# Patient Record
Sex: FEMALE | Race: WHITE | Employment: OTHER | URBAN - METROPOLITAN AREA
[De-identification: names, ages, dates, MRNs, and addresses within clinical notes are randomized per-mention and may not be internally consistent; named-entity substitution may affect disease eponyms.]

---

## 2018-02-28 PROBLEM — R09.89 BRUIT OF LEFT CAROTID ARTERY: Status: ACTIVE | Noted: 2018-02-28

## 2018-02-28 PROBLEM — I65.23 STENOSIS OF BOTH EXTERNAL CAROTID ARTERIES: Status: ACTIVE | Noted: 2018-02-28

## 2019-07-17 ENCOUNTER — HOSPITAL ENCOUNTER (OUTPATIENT)
Age: 84
Discharge: HOME | End: 2019-07-19
Payer: MEDICARE

## 2019-07-17 ENCOUNTER — HOSPITAL ENCOUNTER (OUTPATIENT)
Dept: GENERAL RADIOLOGY | Age: 84
Discharge: HOME | End: 2019-07-19
Payer: MEDICARE

## 2019-07-17 ENCOUNTER — HOSPITAL ENCOUNTER (EMERGENCY)
Age: 84
Discharge: HOME | End: 2019-07-17
Payer: MEDICARE

## 2019-07-17 VITALS
HEART RATE: 96 BPM | RESPIRATION RATE: 16 BRPM | SYSTOLIC BLOOD PRESSURE: 110 MMHG | TEMPERATURE: 97.8 F | OXYGEN SATURATION: 98 % | BODY MASS INDEX: 20.99 KG/M2 | HEIGHT: 65 IN | DIASTOLIC BLOOD PRESSURE: 72 MMHG | WEIGHT: 126 LBS

## 2019-07-17 DIAGNOSIS — S99.922A: ICD-10-CM

## 2019-07-17 DIAGNOSIS — S92.502A CLOSED FRACTURE OF PHALANX OF LEFT FOURTH TOE, INITIAL ENCOUNTER: ICD-10-CM

## 2019-07-17 DIAGNOSIS — S92.352A CLOSED DISPLACED FRACTURE OF FIFTH METATARSAL BONE OF LEFT FOOT, INITIAL ENCOUNTER: Primary | ICD-10-CM

## 2019-07-17 PROCEDURE — 73630 X-RAY EXAM OF FOOT: CPT

## 2019-07-17 PROCEDURE — 99283 EMERGENCY DEPT VISIT LOW MDM: CPT

## 2019-07-17 ASSESSMENT — PAIN DESCRIPTION - PROGRESSION: CLINICAL_PROGRESSION: NOT CHANGED

## 2019-07-17 ASSESSMENT — PAIN DESCRIPTION - DESCRIPTORS
DESCRIPTORS: SORE
DESCRIPTORS: ACHING;CONSTANT;DISCOMFORT

## 2019-07-17 ASSESSMENT — PAIN DESCRIPTION - ORIENTATION
ORIENTATION: LEFT
ORIENTATION: LEFT

## 2019-07-17 ASSESSMENT — PAIN DESCRIPTION - PAIN TYPE
TYPE: ACUTE PAIN
TYPE: ACUTE PAIN

## 2019-07-17 ASSESSMENT — PAIN DESCRIPTION - LOCATION
LOCATION: FOOT
LOCATION: FOOT

## 2019-07-17 ASSESSMENT — PAIN DESCRIPTION - ONSET: ONSET: SUDDEN

## 2019-07-17 ASSESSMENT — PAIN SCALES - GENERAL
PAINLEVEL_OUTOF10: 5
PAINLEVEL_OUTOF10: 5

## 2019-07-17 ASSESSMENT — PAIN DESCRIPTION - FREQUENCY: FREQUENCY: CONTINUOUS

## 2019-07-17 NOTE — ED PROVIDER NOTES
She has never used smokeless tobacco. She reports that she does not drink alcohol or use drugs. Family History: family history is not on file. The patients home medications have been reviewed. Allergies: Pcn [penicillins]    --------------------------------- RESULTS ------------------------------------------  All laboratory and radiology results have been personally reviewed by myself   LABS:  No results found for this visit on 07/17/19. RADIOLOGY:  Interpreted by Radiologist.  No orders to display       ----------------- NURSING NOTES AND VITALS REVIEWED ---------------   The nursing notes within the ED encounter and vital signs as below have been reviewed. /72   Pulse 96   Temp 97.8 °F (36.6 °C) (Oral)   Resp 16   Ht 5' 5\" (1.651 m)   Wt 126 lb (57.2 kg)   SpO2 98%   BMI 20.97 kg/m²   Oxygen Saturation Interpretation: Normal      --------------------------------PHYSICAL EXAM------------------------------------      Constitutional/General: Alert and oriented x3, well appearing, non toxic in NAD  Head: NC/AT  Eyes: PERRL, EOMI  Mouth: Oropharynx clear, handling secretions, no trismus  Neck: Supple, full ROM, no meningeal signs  Pulmonary: Lungs clear to auscultation bilaterally, no wheezes, rales, or rhonchi. Not in respiratory distress  Cardiovascular:  Regular rate and rhythm, no murmurs, gallops, or rubs. 2+ distal pulses  Extremities: Moves all extremities x 4. Exam the left foot is remarkable for market swelling bruising and tenderness over the dorsum of the foot and fifth metatarsal.  She has bruising as well noted over the plantar surface and in the third fourth and fifth toes. Diffusely tender at the sites as well. Pulses and sensation intact. No pain over the ankle joint. Warm and well perfused  Skin: warm and dry without rash  Neurologic: GCS 15,  Intact.   No focal deficits  Psych: Normal Affect      ------------------------ ED COURSE/MEDICAL DECISION MAKING----------------------  Medications - No data to display      Medical Decision Making:    The patient has a fracture of the left fifth metatarsal and left fourth toe. She was placed in a postop shoe. Patient can use Tylenol for pain. Advised to follow-up with the podiatrist as planned tomorrow. She should probably use a walker for now. Discussed plan for follow-up       Counseling: The emergency provider has spoken with the patient and discussed todays results, in addition to providing specific details for the plan of care and counseling regarding the diagnosis and prognosis. Questions are answered at this time and they are agreeable with the plan.      ------------------------ IMPRESSION AND DISPOSITION -------------------------------    IMPRESSION  1. Closed displaced fracture of fifth metatarsal bone of left foot, initial encounter    2.  Closed fracture of phalanx of left fourth toe, initial encounter        DISPOSITION  Disposition: Discharge to home  Patient condition is stable                   Rj Mas PA-C  07/17/19 1828

## 2019-08-12 ENCOUNTER — HOSPITAL ENCOUNTER (OUTPATIENT)
Dept: WOUND CARE | Age: 84
Discharge: HOME | End: 2019-08-12
Payer: MEDICARE

## 2019-08-12 VITALS
RESPIRATION RATE: 16 BRPM | DIASTOLIC BLOOD PRESSURE: 70 MMHG | SYSTOLIC BLOOD PRESSURE: 106 MMHG | BODY MASS INDEX: 20.99 KG/M2 | HEIGHT: 65 IN | HEART RATE: 84 BPM | TEMPERATURE: 98.2 F | WEIGHT: 126 LBS

## 2019-08-12 DIAGNOSIS — I65.23 STENOSIS OF BOTH EXTERNAL CAROTID ARTERIES: ICD-10-CM

## 2019-08-12 DIAGNOSIS — L97.522 ULCERATED, FOOT, LEFT, WITH FAT LAYER EXPOSED (HCC): Primary | ICD-10-CM

## 2019-08-12 DIAGNOSIS — I70.244 ATHEROSCLEROSIS OF NATIVE ARTERY OF LEFT LOWER EXTREMITY WITH ULCERATION OF MIDFOOT (HCC): ICD-10-CM

## 2019-08-12 DIAGNOSIS — L97.521 ULCERATED, FOOT, LEFT, LIMITED TO BREAKDOWN OF SKIN (HCC): ICD-10-CM

## 2019-08-12 DIAGNOSIS — R09.89 BRUIT OF LEFT CAROTID ARTERY: ICD-10-CM

## 2019-08-12 PROCEDURE — 11042 DBRDMT SUBQ TIS 1ST 20SQCM/<: CPT

## 2019-08-12 PROCEDURE — 99214 OFFICE O/P EST MOD 30 MIN: CPT

## 2019-08-12 PROCEDURE — 99214 OFFICE O/P EST MOD 30 MIN: CPT | Performed by: SURGERY

## 2019-08-12 PROCEDURE — 97597 DBRDMT OPN WND 1ST 20 CM/<: CPT | Performed by: SURGERY

## 2019-08-12 PROCEDURE — 87075 CULTR BACTERIA EXCEPT BLOOD: CPT

## 2019-08-12 PROCEDURE — 11042 DBRDMT SUBQ TIS 1ST 20SQCM/<: CPT | Performed by: SURGERY

## 2019-08-12 PROCEDURE — 87070 CULTURE OTHR SPECIMN AEROBIC: CPT

## 2019-08-12 RX ORDER — GABAPENTIN 100 MG/1
100 CAPSULE ORAL 2 TIMES DAILY
COMMUNITY
End: 2019-10-28 | Stop reason: ALTCHOICE

## 2019-08-12 RX ORDER — LIDOCAINE HYDROCHLORIDE 20 MG/ML
JELLY TOPICAL ONCE
Status: DISCONTINUED | OUTPATIENT
Start: 2019-08-12 | End: 2019-08-13 | Stop reason: HOSPADM

## 2019-08-12 RX ORDER — CILOSTAZOL 100 MG/1
100 TABLET ORAL 2 TIMES DAILY
Qty: 60 TABLET | Refills: 11 | Status: SHIPPED | OUTPATIENT
Start: 2019-08-12 | End: 2019-10-07

## 2019-08-12 RX ORDER — DOXYCYCLINE HYCLATE 100 MG/1
100 CAPSULE ORAL DAILY
COMMUNITY
End: 2019-08-19 | Stop reason: ALTCHOICE

## 2019-08-12 NOTE — PROGRESS NOTES
Pressure Ulcers only:  Ulcer: Diabetic ulcer, fat layer exposed    If patient has diabetic lower extremity wounds  Lanier Classification of diabetic lower extremity wounds:    Grade Description   []  0 No open wound   []  1 Superficial ulcer involving the full skin thickness   []  2 Deep ulcer involves ligament, tendon, joint capsule, or fascia  No bone involvement or abscess presence   []  3 Deep Ulcer with abcess formation and/or osteomyelitis   []  4 Localized gangrene   []  5 Extensive gangrene of the foot     Wound:     Patient does have a superficial ulcer, with skin breakdown, clean tissue underneath it healthy, over the medial aspect of the right foot, and over the lateral aspect, full-thickness, with fat layer exposed, with some exudate and some nonviable tissue noted with slight erythema      PAST MEDICAL HISTORY      Diagnosis Date    Carotid stenosis     Diabetes ([address]     Hyperlipidemia     Hypertension     Stenosis of both external carotid arteries 2/28/2018    Ulcerated, foot, left, limited to breakdown of skin ([address] 8/12/2019    Ulcerated, foot, left, with fat layer exposed [address] 8/12/2019    Vitamin D deficiency      Past Surgical History:   Procedure Laterality Date    APPENDECTOMY      EYE SURGERY Bilateral     cataract    TONSILLECTOMY      WISDOM TOOTH EXTRACTION       History reviewed. No pertinent family history. Social History     Tobacco Use    Smoking status: Never Smoker    Smokeless tobacco: Never Used   Substance Use Topics    Alcohol use: No    Drug use: Never     Allergies   Allergen Reactions    Pcn [Penicillins] Hives and Itching     Current Outpatient Medications on File Prior to Encounter   Medication Sig Dispense Refill    gabapentin (NEURONTIN) 100 MG capsule Take 100 mg by mouth 3 times daily.       doxycycline hyclate (VIBRAMYCIN) 100 MG capsule Take 100 mg by mouth daily      BYSTOLIC 10 MG tablet Take 10 mg by mouth daily  3    glipiZIDE (GLUCOTROL) 10 MG Hernias is not noted   Aorta is not palpable   Lymphatics : Cervical lymphadenopathy is not noted     Femoral lymphadenopathy is not noted  SKIN:   Skin color is normal   Texture and turgor is  normal   Induration is not noted  EXTREMITIES:   R UE Edema is not noted  L UE Edema is not noted  R LE Edema is not noted  L LE Edema is not noted    Patient does have a ulcer over the medial aspect of the left foot full-thickness involved  , Also has a blister with exposure of fat layer, over the lateral aspect of the left foot, with slight erythema without any purulent drainage      R femoral 2  left femoral 2   R dorsalis pedis 1 L dorsalis pedis 0   R posterior tibial 0 L posterior tibial 0     Assessment:       1. The lower extremity arterial Doppler study that was done was personally reviewed by me, bilateral femoral-popliteal arterial occlusive disease, with an ankle-brachial index of 0.83 on the right and 0.78 on the left side, with biphasic ankle Doppler tracings, potentially adequate collateral flow to both feet based upon the pulse volume recordings of the metatarsals and toes for tissue healing  Problem List Items Addressed This Visit     Ulcerated, foot, left, limited to breakdown of skin ([address]          2. Ulcer left foot with the fat layer exposed    3. Diabetes mellitus with diabetic neuropathy      Procedure Note  Indications:  Based on my examination of this patient's wound(s)/ulcer(s) today, debridement is required to promote healing and evaluate the wound base. Performed by: Kvng Quintero MD    Consent obtained:  Yes    Time out taken:  Yes    Pain Control: Anesthetic  Anesthetic: 2% Lidocaine Gel Topical     Debridement:Excisional Debridement    Using curette, #15 blade scalpel, scissors and forceps the wound(s)/ulcer(s) was/were sharply debrided down through and including the removal of epidermis, dermis and subcutaneous tissue.         Devitalized Tissue Debrided:  fibrin    Pre Debridement Measurements:  Are located in the [facility]  Documentation Flow Sheet    Wound/Ulcer #: 1 and 2    Post Debridement Measurements:  Wound/Ulcer Descriptions are Pre Debridement except measurements:    Wound 08/12/19 Foot Anterior; Left wound #1 left anterior foot acquired 7/22/19 (Active)   Wound Image   8/12/2019  9:04 AM   Wound Length (cm) 5.3 cm 8/12/2019  9:04 AM   Wound Width (cm) 5.2 cm 8/12/2019  9:04 AM   Wound Depth (cm) 0.1 cm 8/12/2019  9:04 AM   Wound Surface Area (cm^2) 27.56 cm^2 8/12/2019  9:04 AM   Wound Volume (cm^3) 2.76 cm^3 8/12/2019  9:04 AM   Post-Procedure Length (cm) 5.5 cm 8/12/2019  9:33 AM   Post-Procedure Width (cm) 5.5 cm 8/12/2019  9:33 AM   Post-Procedure Depth (cm) 0.1 cm 8/12/2019  9:33 AM   Post-Procedure Surface Area (cm^2) 30.25 cm^2 8/12/2019  9:33 AM   Post-Procedure Volume (cm^3) 3.02 cm^3 8/12/2019  9:33 AM   Wound Assessment Pink;Purple 8/12/2019  9:04 AM   Drainage Amount None 8/12/2019  9:04 AM   Odor None 8/12/2019  9:04 AM   Radha-wound Assessment Red 8/12/2019  9:04 AM   Non-staged Wound Description Full thickness 8/12/2019  9:04 AM   Number of days: 0       Wound 08/12/19 Foot Left;Lateral wound #2 left lateral foot acquired 7/22/19 (Active)   Wound Image   8/12/2019  9:04 AM   Wound Length (cm) 7.1 cm 8/12/2019  9:04 AM   Wound Width (cm) 2 cm 8/12/2019  9:04 AM   Wound Depth (cm) 0 cm 8/12/2019  9:04 AM   Wound Surface Area (cm^2) 14.2 cm^2 8/12/2019  9:04 AM   Wound Volume (cm^3) 0 cm^3 8/12/2019  9:04 AM   Post-Procedure Length (cm) 7.3 cm 8/12/2019  9:33 AM   Post-Procedure Width (cm) 2.1 cm 8/12/2019  9:33 AM   Post-Procedure Depth (cm) 0.1 cm 8/12/2019  9:33 AM   Post-Procedure Surface Area (cm^2) 15.33 cm^2 8/12/2019  9:33 AM   Post-Procedure Volume (cm^3) 1.53 cm^3 8/12/2019  9:33 AM   Wound Assessment Yellow 8/12/2019  9:04 AM   Drainage Amount None 8/12/2019  9:04 AM   Odor None 8/12/2019  9:04 AM   Radha-wound Assessment Dark edges 8/12/2019  9:04 GIVEN.    KEEP ALL PRESSURE OFF WOUNDS        [facility] followup visit __1 WEEK___________________________  (Please note your next appointment above and if you are unable to keep, kindly give a 24 hour notice. Thank you.)    Physician signature:__________________________      If you experience any of the following, please call the [facility] during business hours:    * Increase in Pain  * Temperature over 101  * Increase in drainage from your wound  * Drainage with a foul odor  * Bleeding  * Increase in swelling  * Need for compression bandage changes due to slippage, breakthrough drainage. If you need medical attention outside of the business hours of the [facility] please contact your PCP or go to the nearest emergency room.

## 2019-08-14 LAB — WOUND/ABSCESS: NORMAL

## 2019-08-17 LAB — ANAEROBIC CULTURE: NORMAL

## 2019-08-19 ENCOUNTER — HOSPITAL ENCOUNTER (OUTPATIENT)
Dept: WOUND CARE | Age: 84
Discharge: HOME | End: 2019-08-19
Payer: MEDICARE

## 2019-08-19 VITALS
WEIGHT: 126 LBS | SYSTOLIC BLOOD PRESSURE: 138 MMHG | BODY MASS INDEX: 20.99 KG/M2 | DIASTOLIC BLOOD PRESSURE: 58 MMHG | HEART RATE: 88 BPM | HEIGHT: 65 IN | RESPIRATION RATE: 18 BRPM | TEMPERATURE: 97.7 F

## 2019-08-19 DIAGNOSIS — L97.522 ULCERATED, FOOT, LEFT, WITH FAT LAYER EXPOSED (HCC): ICD-10-CM

## 2019-08-19 DIAGNOSIS — I70.244 ATHEROSCLEROSIS OF NATIVE ARTERY OF LEFT LOWER EXTREMITY WITH ULCERATION OF MIDFOOT (HCC): ICD-10-CM

## 2019-08-19 DIAGNOSIS — L97.521 ULCERATED, FOOT, LEFT, LIMITED TO BREAKDOWN OF SKIN (HCC): ICD-10-CM

## 2019-08-19 PROCEDURE — 11042 DBRDMT SUBQ TIS 1ST 20SQCM/<: CPT

## 2019-08-19 PROCEDURE — 11042 DBRDMT SUBQ TIS 1ST 20SQCM/<: CPT | Performed by: SURGERY

## 2019-08-19 PROCEDURE — 97597 DBRDMT OPN WND 1ST 20 CM/<: CPT | Performed by: SURGERY

## 2019-08-19 PROCEDURE — 97597 DBRDMT OPN WND 1ST 20 CM/<: CPT

## 2019-08-19 RX ORDER — LIDOCAINE HYDROCHLORIDE 20 MG/ML
JELLY TOPICAL ONCE
Status: DISCONTINUED | OUTPATIENT
Start: 2019-08-19 | End: 2019-08-20 | Stop reason: HOSPADM

## 2019-08-19 NOTE — PROGRESS NOTES
PRESSURE OFF WOUNDS           [facility] followup visit __1 WEEK___________________________  (Please note your next appointment above and if you are unable to keep, kindly give a 24 hour notice.  Thank you.)     Physician signature:__________________________        If you experience any of the following, please call the [facility] during business hours:     * Increase in Pain  * Temperature over 101  * Increase in drainage from your wound  * Drainage with a foul odor  * Bleeding  * Increase in swelling  * Need for compression bandage changes due to slippage, breakthrough drainage.     If you need medical attention outside of the business hours of the [facility] please contact your PCP or go to the nearest emergency room.                 Electronically signed by Olivia Bai MD on 8/19/2019 at 9:23 AM

## 2019-08-26 ENCOUNTER — HOSPITAL ENCOUNTER (OUTPATIENT)
Dept: WOUND CARE | Age: 84
Discharge: HOME | End: 2019-08-26
Payer: MEDICARE

## 2019-08-26 VITALS
DIASTOLIC BLOOD PRESSURE: 50 MMHG | HEIGHT: 65 IN | TEMPERATURE: 98 F | SYSTOLIC BLOOD PRESSURE: 120 MMHG | WEIGHT: 126 LBS | BODY MASS INDEX: 20.99 KG/M2 | HEART RATE: 68 BPM | RESPIRATION RATE: 18 BRPM

## 2019-08-26 DIAGNOSIS — I70.244 ATHEROSCLEROSIS OF NATIVE ARTERY OF LEFT LOWER EXTREMITY WITH ULCERATION OF MIDFOOT (HCC): ICD-10-CM

## 2019-08-26 DIAGNOSIS — L97.521 ULCERATED, FOOT, LEFT, LIMITED TO BREAKDOWN OF SKIN (HCC): ICD-10-CM

## 2019-08-26 DIAGNOSIS — L97.522 ULCERATED, FOOT, LEFT, WITH FAT LAYER EXPOSED (HCC): ICD-10-CM

## 2019-08-26 PROCEDURE — 11042 DBRDMT SUBQ TIS 1ST 20SQCM/<: CPT | Performed by: SURGERY

## 2019-08-26 PROCEDURE — 11042 DBRDMT SUBQ TIS 1ST 20SQCM/<: CPT

## 2019-08-26 RX ORDER — LIDOCAINE HYDROCHLORIDE 20 MG/ML
JELLY TOPICAL ONCE
Status: DISCONTINUED | OUTPATIENT
Start: 2019-08-26 | End: 2019-08-27 | Stop reason: HOSPADM

## 2019-08-26 NOTE — PROGRESS NOTES
Wound Healing Center Followup Visit Note    Referring Physician : Tyree Murphy MD  Dahiana Hahn  MEDICAL RECORD NUMBER:  [id number]  AGE: 80 y.o. GENDER: female  : 1927  EPISODE DATE:  2019    Subjective:           Chief Complaint   Patient presents with    Wound Check       left foot         HISTORY of PRESENT ILLNESS HPI      Lina Palomares is a 80 y. o. female who presents today for wound/ulcer evaluation.    History of Wound Context:  The patient has had a wound of left foot which was first noted approximately in July of this year, patient fell down, sustained a fracture of the left fifth toe, according the patient and her son Cyndi Brewer, this was immobilized with a Ace bandage wrap, a few days later patient developed pain, the Ace bandage was removed, patient developed pressure ulcers over the medial aspect of the left foot as well as the lateral aspect of left foot with skin breakdown, underwent evaluation by her podiatrist Dr. Skylar Garner, did undergo a lower [employer] arterial Doppler study revealed evidence of femoral-popliteal arterial occlusive disease, and patient was referred to the wound care center for further evaluation and management.  This has been treated . Guerita Cardenas their initial visit to the wound healing center, 19, the patient has noted that the wound has not been improving.  The patient has not had similar previous wounds in the past.       Patient does give history of diabetes mellitus and neuropathy     Patient can walk a few blocks slowly     Pt is currently on abx, doxycycline.     Wound/Ulcer Pain Timing/Severity: constant  Quality of pain: aching, burning, tingling  Severity:  3 / 10   Modifying Factors: Pain worsens with walking  Associated Signs/Symptoms: pain and tingling     Ulcer Identification:  Ulcer Type: arterial, diabetic, pressure and non-healing/non-surgical  Contributing Factors: diabetes and arterial insufficiency     Diabetic/Pressure/Non Pressure Ulcers Changed/New 8/19/2019  9:27 AM   Dressing/Treatment Xeroform;Dry Dressing 8/19/2019  9:27 AM   Wound Cleansed Rinsed/Irrigated with saline 8/19/2019  9:27 AM   Wound Length (cm) 7 cm 8/26/2019  9:19 AM   Wound Width (cm) 1.9 cm 8/26/2019  9:19 AM   Wound Depth (cm) 0.1 cm 8/26/2019  9:19 AM   Wound Surface Area (cm^2) 13.3 cm^2 8/26/2019  9:19 AM   Change in Wound Size % (l*w) 6.34 8/26/2019  9:19 AM   Wound Volume (cm^3) 1.33 cm^3 8/26/2019  9:19 AM   Post-Procedure Length (cm) 7 cm 8/26/2019  9:29 AM   Post-Procedure Width (cm) 2 cm 8/26/2019  9:29 AM   Post-Procedure Depth (cm) 0.1 cm 8/26/2019  9:29 AM   Post-Procedure Surface Area (cm^2) 14 cm^2 8/26/2019  9:29 AM   Post-Procedure Volume (cm^3) 1.4 cm^3 8/26/2019  9:29 AM   Wound Assessment Black;Dry 8/26/2019  9:19 AM   Drainage Amount None 8/26/2019  9:19 AM   Odor None 8/26/2019  9:19 AM   Radha-wound Assessment Pink; Intact 8/26/2019  9:19 AM   Non-staged Wound Description Full thickness 8/12/2019  9:04 AM   Number of days: 14     Percent of Wound/Ulcer Debrided: 70%    Total Surface Area Debrided:  18 sq cm     Estimated Blood Loss:  None  Hemostasis Achieved:  by pressure    Procedural Pain:  2  / 10   Post Procedural Pain:  3 / 10     Response to treatment:  Well tolerated by patient. Plan:   Treatment Note please see attached Discharge Instructions    Written patient dismissal instructions given to patient and signed by patient or POA. Discharge Instructions         Visit Discharge/Physician Orders     Discharge condition: Stable     Assessment of pain at discharge:NONE     Anesthetic used: LIDOCAINE 2%     Discharge to: Home     Left via:Private automobile     Accompanied by: SON     ECF/KURTISA: [facility]     Dressing Orders:WOUND LOCATION LEFT FOOT CLEANSE WOUND WITH NORMAL SALINE APPLY XEROFORM GAUZE, COVER WITH A DRY DRESSING AND SECURE.  CHANGE DAILY.     Treatment Orders:ELEVATE LEGS ABOVE THE LEVEL OF THE HEART AS MUCH AS

## 2019-09-03 ENCOUNTER — HOSPITAL ENCOUNTER (OUTPATIENT)
Dept: WOUND CARE | Age: 84
Discharge: HOME | End: 2019-09-03
Payer: MEDICARE

## 2019-09-03 VITALS
HEART RATE: 66 BPM | SYSTOLIC BLOOD PRESSURE: 128 MMHG | DIASTOLIC BLOOD PRESSURE: 70 MMHG | TEMPERATURE: 98.4 F | RESPIRATION RATE: 16 BRPM

## 2019-09-03 DIAGNOSIS — L97.522 ULCERATED, FOOT, LEFT, WITH FAT LAYER EXPOSED (HCC): ICD-10-CM

## 2019-09-03 DIAGNOSIS — I70.244 ATHEROSCLEROSIS OF NATIVE ARTERY OF LEFT LOWER EXTREMITY WITH ULCERATION OF MIDFOOT (HCC): ICD-10-CM

## 2019-09-03 PROCEDURE — 99211 OFF/OP EST MAY X REQ PHY/QHP: CPT

## 2019-09-03 ASSESSMENT — PAIN SCALES - GENERAL: PAINLEVEL_OUTOF10: 0

## 2019-09-09 ENCOUNTER — HOSPITAL ENCOUNTER (OUTPATIENT)
Dept: WOUND CARE | Age: 84
Discharge: HOME | End: 2019-09-09
Payer: MEDICARE

## 2019-09-09 VITALS
HEART RATE: 88 BPM | HEIGHT: 65 IN | RESPIRATION RATE: 16 BRPM | WEIGHT: 126 LBS | SYSTOLIC BLOOD PRESSURE: 102 MMHG | DIASTOLIC BLOOD PRESSURE: 60 MMHG | TEMPERATURE: 98.6 F | BODY MASS INDEX: 20.99 KG/M2

## 2019-09-09 PROCEDURE — 11042 DBRDMT SUBQ TIS 1ST 20SQCM/<: CPT

## 2019-09-09 PROCEDURE — 11042 DBRDMT SUBQ TIS 1ST 20SQCM/<: CPT | Performed by: SURGERY

## 2019-09-09 RX ORDER — LIDOCAINE HYDROCHLORIDE 20 MG/ML
JELLY TOPICAL ONCE
Status: DISCONTINUED | OUTPATIENT
Start: 2019-09-09 | End: 2019-09-10 | Stop reason: HOSPADM

## 2019-09-09 NOTE — PROGRESS NOTES
(NEURONTIN) 100 MG capsule Take 100 mg by mouth 3 times daily.  cilostazol (PLETAL) 100 MG tablet Take 1 tablet by mouth 2 times daily 60 tablet 11    BYSTOLIC 10 MG tablet Take 10 mg by mouth daily  3    simvastatin (ZOCOR) 10 MG tablet Take 10 mg by mouth daily      telmisartan (MICARDIS) 80 MG tablet Take 80 mg by mouth daily  3    chlorthalidone (HYGROTON) 25 MG tablet Take 12.5 mg by mouth daily      Cholecalciferol (VITAMIN D3) 2000 units CAPS Take by mouth      aspirin 81 MG EC tablet Take 81 mg by mouth daily      glipiZIDE (GLUCOTROL) 10 MG tablet Take 10 mg by mouth daily       No current facility-administered medications on file prior to encounter. REVIEW OF SYSTEMS See HPI    Objective:    /60   Pulse 88   Temp 98.6 °F (37 °C) (Oral)   Resp 16   Ht 5' 5\" (1.651 m)   Wt 126 lb (57.2 kg)   BMI 20.97 kg/m²   Wt Readings from Last 3 Encounters:   09/09/19 126 lb (57.2 kg)   08/26/19 126 lb (57.2 kg)   08/19/19 126 lb (57.2 kg)     PHYSICAL EXAM  CONSTITUTIONAL:   Awake, alert, cooperative   EYES:  lids and lashes normal  ENT: external ears and nose without lesions  NECK:  supple, symmetrical, trachea midline  SKIN:  Open wound Present    Assessment:     Problem List Items Addressed This Visit     None        Procedure Note  Indications:  Based on my examination of this patient's wound(s)/ulcer(s) today, debridement is required to promote healing and evaluate the wound base. Performed by: Beltran Arrieta MD    Consent obtained:  Yes    Time out taken:  Yes    Pain Control:       Debridement:Excisional Debridement    Using curette the wound(s)/ulcer(s) was/were sharply debrided down through and including the removal of subcutaneous tissue.         Devitalized Tissue Debrided:  fibrin and necrotic/eschar to stimulate bleeding to promote healing, post debridement good bleeding base and wound edges noted    Pre Debridement Measurements:  Are located in the [facility]

## 2019-09-16 ENCOUNTER — HOSPITAL ENCOUNTER (OUTPATIENT)
Dept: WOUND CARE | Age: 84
Discharge: HOME | End: 2019-09-16
Payer: MEDICARE

## 2019-09-16 VITALS
TEMPERATURE: 98 F | DIASTOLIC BLOOD PRESSURE: 48 MMHG | HEIGHT: 65 IN | RESPIRATION RATE: 16 BRPM | HEART RATE: 88 BPM | WEIGHT: 126 LBS | SYSTOLIC BLOOD PRESSURE: 102 MMHG | BODY MASS INDEX: 20.99 KG/M2

## 2019-09-16 PROCEDURE — 11042 DBRDMT SUBQ TIS 1ST 20SQCM/<: CPT

## 2019-09-16 PROCEDURE — 11042 DBRDMT SUBQ TIS 1ST 20SQCM/<: CPT | Performed by: SURGERY

## 2019-09-16 RX ORDER — LIDOCAINE HYDROCHLORIDE 20 MG/ML
JELLY TOPICAL PRN
Status: DISCONTINUED | OUTPATIENT
Start: 2019-09-16 | End: 2019-09-17 | Stop reason: HOSPADM

## 2019-09-16 NOTE — PROGRESS NOTES
Wound Healing Center Followup Visit Note    Referring Physician : Jaquelin Stuart MD  Amanda Molbey  MEDICAL RECORD NUMBER:  [id number]  AGE: 80 y.o. GENDER: female  : 1927  EPISODE DATE:  2019    Subjective:         Chief Complaint   Patient presents with    Wound Check       left foot         HISTORY of PRESENT ILLNESS HPI      Lina Palomares is a 80 y. o. female who presents today for wound/ulcer evaluation.    History of Wound Context:  The patient has had a wound of left foot which was first noted approximately in July of this year, patient fell down, sustained a fracture of the left fifth toe, according the patient and her son Rakan Negrete, this was immobilized with a Ace bandage wrap, a few days later patient developed pain, the Ace bandage was removed, patient developed pressure ulcers over the medial aspect of the left foot as well as the lateral aspect of left foot with skin breakdown, underwent evaluation by her podiatrist Dr. Mary Rubi, did undergo a lower [employer] arterial Doppler study revealed evidence of femoral-popliteal arterial occlusive disease, and patient was referred to the wound care center for further evaluation and management.  This has been treated . Kassidy Newman their initial visit to the wound healing center, 19, the patient has noted that the wound has not been improving.  The patient has not had similar previous wounds in the past.       Patient does give history of diabetes mellitus and neuropathy     Patient can walk a few blocks slowly     Pt is currently on abx, doxycycline.     Wound/Ulcer Pain Timing/Severity: constant  Quality of pain: aching, burning, tingling  Severity:  3 / 10   Modifying Factors: Pain worsens with walking  Associated Signs/Symptoms: pain and tingling     Ulcer Identification:  Ulcer Type: arterial, diabetic, pressure and non-healing/non-surgical  Contributing Factors: diabetes and arterial insufficiency     Diabetic/Pressure/Non Pressure Ulcers only:  Ulcer: Diabetic ulcer, fat layer exposed     If patient has diabetic lower extremity wounds  Lanier Classification of diabetic lower extremity wounds:     Grade Description   []  0 No open wound   []  1 Superficial ulcer involving the full skin thickness   []  2 Deep ulcer involves ligament, tendon, joint capsule, or fascia  No bone involvement or abscess presence   []  3 Deep Ulcer with abcess formation and/or osteomyelitis   []  4 Localized gangrene   []  5 Extensive gangrene of the foot      Wound:      Patient does have a superficial ulcer, with skin breakdown, clean tissue underneath it healthy, over the medial aspect of the right foot, and over the lateral aspect, full-thickness, with fat layer exposed, with some exudate and some nonviable tissue noted with slight erythema           August 26, 2019: Discussed the patient the family, at the time of next visit, consider Santyl cream to the left foot                       PAST MEDICAL HISTORY      Diagnosis Date    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 No pertinent family history.   Social History     Tobacco Use    Smoking status: Never Smoker    Smokeless tobacco: Never Used   Substance Use Topics    Alcohol use: No    Drug use: Never     Allergies   Allergen Reactions    Pcn [Penicillins] Hives and Itching     Current Outpatient Medications on File Prior to Encounter   Medication Sig Dispense Refill    POSSIBLE.        PLETAL AS ORDERED / SCRIPT GIVEN.     KEEP ALL PRESSURE OFF WOUNDS           [facility] followup visit __1 WEEK DR CELAYA__________________________  (Please note your next appointment above and if you are unable to keep, kindly give a 24 hour notice.  Thank you.)     Physician signature:__________________________        If you experience any of the following, please call the [facility] during business hours:     * Increase in Pain  * Temperature over 101  * Increase in drainage from your wound  * Drainage with a foul odor  * Bleeding  * Increase in swelling  * Need for compression bandage changes due to slippage, breakthrough drainage.     If you need medical attention outside of the business hours of the [facility] [facility] please contact your PCP or go to the nearest emergency room.                       Electronically signed by Gladis Bosworth, MD on 9/16/2019 at 9:43 AM

## 2019-09-23 ENCOUNTER — HOSPITAL ENCOUNTER (OUTPATIENT)
Dept: WOUND CARE | Age: 84
Discharge: HOME | End: 2019-09-23
Payer: MEDICARE

## 2019-09-23 VITALS
WEIGHT: 126 LBS | BODY MASS INDEX: 20.99 KG/M2 | RESPIRATION RATE: 16 BRPM | DIASTOLIC BLOOD PRESSURE: 60 MMHG | HEART RATE: 64 BPM | TEMPERATURE: 97.5 F | SYSTOLIC BLOOD PRESSURE: 110 MMHG | HEIGHT: 65 IN

## 2019-09-23 DIAGNOSIS — L97.521 ULCERATED, FOOT, LEFT, LIMITED TO BREAKDOWN OF SKIN (HCC): ICD-10-CM

## 2019-09-23 PROCEDURE — 11042 DBRDMT SUBQ TIS 1ST 20SQCM/<: CPT | Performed by: SURGERY

## 2019-09-23 PROCEDURE — 11042 DBRDMT SUBQ TIS 1ST 20SQCM/<: CPT

## 2019-09-23 RX ORDER — LIDOCAINE HYDROCHLORIDE 20 MG/ML
JELLY TOPICAL ONCE
Status: DISCONTINUED | OUTPATIENT
Start: 2019-09-23 | End: 2019-09-24 | Stop reason: HOSPADM

## 2019-09-30 ENCOUNTER — HOSPITAL ENCOUNTER (OUTPATIENT)
Dept: WOUND CARE | Age: 84
Discharge: HOME | End: 2019-09-30
Payer: MEDICARE

## 2019-09-30 VITALS
HEIGHT: 65 IN | HEART RATE: 76 BPM | RESPIRATION RATE: 16 BRPM | TEMPERATURE: 98 F | WEIGHT: 126 LBS | DIASTOLIC BLOOD PRESSURE: 54 MMHG | BODY MASS INDEX: 20.99 KG/M2 | SYSTOLIC BLOOD PRESSURE: 110 MMHG

## 2019-09-30 DIAGNOSIS — I70.244 ATHEROSCLEROSIS OF NATIVE ARTERY OF LEFT LOWER EXTREMITY WITH ULCERATION OF MIDFOOT (HCC): ICD-10-CM

## 2019-09-30 DIAGNOSIS — L97.522 ULCERATED, FOOT, LEFT, WITH FAT LAYER EXPOSED (HCC): ICD-10-CM

## 2019-09-30 PROCEDURE — 11042 DBRDMT SUBQ TIS 1ST 20SQCM/<: CPT | Performed by: SURGERY

## 2019-09-30 PROCEDURE — 11042 DBRDMT SUBQ TIS 1ST 20SQCM/<: CPT

## 2019-09-30 RX ORDER — LIDOCAINE HYDROCHLORIDE 20 MG/ML
JELLY TOPICAL ONCE
Status: DISCONTINUED | OUTPATIENT
Start: 2019-09-30 | End: 2019-10-01 | Stop reason: HOSPADM

## 2019-10-07 ENCOUNTER — HOSPITAL ENCOUNTER (OUTPATIENT)
Dept: WOUND CARE | Age: 84
Discharge: HOME | End: 2019-10-07
Payer: MEDICARE

## 2019-10-07 ENCOUNTER — HOSPITAL ENCOUNTER (OUTPATIENT)
Age: 84
Discharge: HOME | End: 2019-10-09
Payer: MEDICARE

## 2019-10-07 VITALS
HEART RATE: 88 BPM | WEIGHT: 126 LBS | SYSTOLIC BLOOD PRESSURE: 98 MMHG | RESPIRATION RATE: 20 BRPM | BODY MASS INDEX: 20.99 KG/M2 | HEIGHT: 65 IN | TEMPERATURE: 97.9 F | DIASTOLIC BLOOD PRESSURE: 50 MMHG

## 2019-10-07 DIAGNOSIS — L97.522 ULCERATED, FOOT, LEFT, WITH FAT LAYER EXPOSED (HCC): ICD-10-CM

## 2019-10-07 DIAGNOSIS — E11.621 TYPE 2 DIABETES MELLITUS WITH LEFT DIABETIC FOOT ULCER (HCC): ICD-10-CM

## 2019-10-07 DIAGNOSIS — L97.529 TYPE 2 DIABETES MELLITUS WITH LEFT DIABETIC FOOT ULCER (HCC): ICD-10-CM

## 2019-10-07 DIAGNOSIS — E11.621 DIABETIC ULCER OF LEFT FOOT ASSOCIATED WITH TYPE 2 DIABETES MELLITUS, WITH NECROSIS OF MUSCLE, UNSPECIFIED PART OF FOOT (HCC): ICD-10-CM

## 2019-10-07 DIAGNOSIS — L97.523 DIABETIC ULCER OF LEFT FOOT ASSOCIATED WITH TYPE 2 DIABETES MELLITUS, WITH NECROSIS OF MUSCLE, UNSPECIFIED PART OF FOOT (HCC): ICD-10-CM

## 2019-10-07 DIAGNOSIS — I70.244 ATHEROSCLEROSIS OF NATIVE ARTERY OF LEFT LOWER EXTREMITY WITH ULCERATION OF MIDFOOT (HCC): ICD-10-CM

## 2019-10-07 PROCEDURE — 87186 SC STD MICRODIL/AGAR DIL: CPT

## 2019-10-07 PROCEDURE — 11043 DBRDMT MUSC&/FSCA 1ST 20/<: CPT | Performed by: SURGERY

## 2019-10-07 PROCEDURE — 11043 DBRDMT MUSC&/FSCA 1ST 20/<: CPT

## 2019-10-07 PROCEDURE — 87070 CULTURE OTHR SPECIMN AEROBIC: CPT

## 2019-10-07 PROCEDURE — 87075 CULTR BACTERIA EXCEPT BLOOD: CPT

## 2019-10-07 RX ORDER — CILOSTAZOL 100 MG/1
100 TABLET ORAL 2 TIMES DAILY
Status: ON HOLD | COMMUNITY
End: 2019-10-14

## 2019-10-07 RX ORDER — LIDOCAINE HYDROCHLORIDE 20 MG/ML
JELLY TOPICAL ONCE
Status: DISCONTINUED | OUTPATIENT
Start: 2019-10-07 | End: 2019-10-08 | Stop reason: HOSPADM

## 2019-10-09 LAB
ANAEROBIC CULTURE: NORMAL
ORGANISM: ABNORMAL
WOUND/ABSCESS: ABNORMAL

## 2019-10-09 RX ORDER — DOXYCYCLINE HYCLATE 100 MG
100 TABLET ORAL 2 TIMES DAILY
COMMUNITY
End: 2019-10-14 | Stop reason: SDUPTHER

## 2019-10-09 RX ORDER — DOXYCYCLINE HYCLATE 100 MG
100 TABLET ORAL 2 TIMES DAILY
Qty: 20 TABLET | Refills: 0 | Status: SHIPPED | OUTPATIENT
Start: 2019-10-09 | End: 2019-10-19

## 2019-10-14 ENCOUNTER — HOSPITAL ENCOUNTER (INPATIENT)
Age: 84
LOS: 5 days | Discharge: HOME HEALTH | DRG: 264 | End: 2019-10-19
Attending: EMERGENCY MEDICINE | Admitting: INTERNAL MEDICINE
Payer: MEDICARE

## 2019-10-14 ENCOUNTER — APPOINTMENT (OUTPATIENT)
Dept: GENERAL RADIOLOGY | Age: 84
DRG: 264 | End: 2019-10-14
Payer: MEDICARE

## 2019-10-14 ENCOUNTER — HOSPITAL ENCOUNTER (OUTPATIENT)
Dept: WOUND CARE | Age: 84
Discharge: HOME | DRG: 264 | End: 2019-10-14
Payer: MEDICARE

## 2019-10-14 VITALS
SYSTOLIC BLOOD PRESSURE: 102 MMHG | DIASTOLIC BLOOD PRESSURE: 64 MMHG | BODY MASS INDEX: 20.99 KG/M2 | HEART RATE: 126 BPM | RESPIRATION RATE: 16 BRPM | TEMPERATURE: 97.8 F | HEIGHT: 65 IN | WEIGHT: 126 LBS

## 2019-10-14 DIAGNOSIS — L97.522 ULCERATED, FOOT, LEFT, WITH FAT LAYER EXPOSED (HCC): Primary | ICD-10-CM

## 2019-10-14 DIAGNOSIS — I48.91 ATRIAL FIBRILLATION WITH RVR (HCC): ICD-10-CM

## 2019-10-14 DIAGNOSIS — I48.91 NEW ONSET A-FIB (HCC): ICD-10-CM

## 2019-10-14 LAB
ALBUMIN SERPL-MCNC: 3.8 G/DL (ref 3.5–5.2)
ALP BLD-CCNC: 71 U/L (ref 35–104)
ALT SERPL-CCNC: 9 U/L (ref 0–32)
ANION GAP SERPL CALCULATED.3IONS-SCNC: 12 MMOL/L (ref 7–16)
ANISOCYTOSIS: ABNORMAL
AST SERPL-CCNC: 16 U/L (ref 0–31)
BASOPHILIC STIPPLING: ABNORMAL
BASOPHILS ABSOLUTE: 0.08 E9/L (ref 0–0.2)
BASOPHILS RELATIVE PERCENT: 0.7 % (ref 0–2)
BILIRUB SERPL-MCNC: 0.5 MG/DL (ref 0–1.2)
BUN BLDV-MCNC: 42 MG/DL (ref 8–23)
BURR CELLS: ABNORMAL
CALCIUM SERPL-MCNC: 9.8 MG/DL (ref 8.6–10.2)
CHLORIDE BLD-SCNC: 97 MMOL/L (ref 98–107)
CO2: 25 MMOL/L (ref 22–29)
CREAT SERPL-MCNC: 1.5 MG/DL (ref 0.5–1)
EKG ATRIAL RATE: 163 BPM
EKG Q-T INTERVAL: 306 MS
EKG QRS DURATION: 108 MS
EKG QTC CALCULATION (BAZETT): 494 MS
EKG R AXIS: 56 DEGREES
EKG T AXIS: -114 DEGREES
EKG VENTRICULAR RATE: 157 BPM
EOSINOPHILS ABSOLUTE: 0.09 E9/L (ref 0.05–0.5)
EOSINOPHILS RELATIVE PERCENT: 0.8 % (ref 0–6)
GFR AFRICAN AMERICAN: 39
GFR NON-AFRICAN AMERICAN: 32 ML/MIN/1.73
GLUCOSE BLD-MCNC: 177 MG/DL (ref 74–99)
HCT VFR BLD CALC: 32.9 % (ref 34–48)
HEMOGLOBIN: 10.2 G/DL (ref 11.5–15.5)
IMMATURE GRANULOCYTES #: 0.02 E9/L
IMMATURE GRANULOCYTES %: 0.2 % (ref 0–5)
LYMPHOCYTES ABSOLUTE: 6.31 E9/L (ref 1.5–4)
LYMPHOCYTES RELATIVE PERCENT: 54.2 % (ref 20–42)
MAGNESIUM: 1.5 MG/DL (ref 1.6–2.6)
MCH RBC QN AUTO: 28.4 PG (ref 26–35)
MCHC RBC AUTO-ENTMCNC: 31 % (ref 32–34.5)
MCV RBC AUTO: 91.6 FL (ref 80–99.9)
MONOCYTES ABSOLUTE: 0.48 E9/L (ref 0.1–0.95)
MONOCYTES RELATIVE PERCENT: 4.1 % (ref 2–12)
NEUTROPHILS ABSOLUTE: 4.67 E9/L (ref 1.8–7.3)
NEUTROPHILS RELATIVE PERCENT: 40 % (ref 43–80)
OVALOCYTES: ABNORMAL
PDW BLD-RTO: 15.2 FL (ref 11.5–15)
PLATELET # BLD: 248 E9/L (ref 130–450)
PMV BLD AUTO: 9.9 FL (ref 7–12)
POIKILOCYTES: ABNORMAL
POLYCHROMASIA: ABNORMAL
POTASSIUM SERPL-SCNC: 4.8 MMOL/L (ref 3.5–5)
PRO-BNP: 3279 PG/ML (ref 0–450)
RBC # BLD: 3.59 E12/L (ref 3.5–5.5)
SODIUM BLD-SCNC: 134 MMOL/L (ref 132–146)
TARGET CELLS: ABNORMAL
TEAR DROP CELLS: ABNORMAL
TOTAL PROTEIN: 7.7 G/DL (ref 6.4–8.3)
TROPONIN: <0.01 NG/ML (ref 0–0.03)
WBC # BLD: 11.7 E9/L (ref 4.5–11.5)

## 2019-10-14 PROCEDURE — 2500000003 HC RX 250 WO HCPCS: Performed by: INTERNAL MEDICINE

## 2019-10-14 PROCEDURE — 84484 ASSAY OF TROPONIN QUANT: CPT

## 2019-10-14 PROCEDURE — 11042 DBRDMT SUBQ TIS 1ST 20SQCM/<: CPT

## 2019-10-14 PROCEDURE — 83880 ASSAY OF NATRIURETIC PEPTIDE: CPT

## 2019-10-14 PROCEDURE — 2060000000 HC ICU INTERMEDIATE R&B

## 2019-10-14 PROCEDURE — 96374 THER/PROPH/DIAG INJ IV PUSH: CPT

## 2019-10-14 PROCEDURE — 99285 EMERGENCY DEPT VISIT HI MDM: CPT

## 2019-10-14 PROCEDURE — 93010 ELECTROCARDIOGRAM REPORT: CPT | Performed by: INTERNAL MEDICINE

## 2019-10-14 PROCEDURE — 80053 COMPREHEN METABOLIC PANEL: CPT

## 2019-10-14 PROCEDURE — 2500000003 HC RX 250 WO HCPCS: Performed by: EMERGENCY MEDICINE

## 2019-10-14 PROCEDURE — 36415 COLL VENOUS BLD VENIPUNCTURE: CPT

## 2019-10-14 PROCEDURE — 2580000003 HC RX 258: Performed by: EMERGENCY MEDICINE

## 2019-10-14 PROCEDURE — 6370000000 HC RX 637 (ALT 250 FOR IP): Performed by: INTERNAL MEDICINE

## 2019-10-14 PROCEDURE — 85025 COMPLETE CBC W/AUTO DIFF WBC: CPT

## 2019-10-14 PROCEDURE — 71045 X-RAY EXAM CHEST 1 VIEW: CPT

## 2019-10-14 PROCEDURE — 11042 DBRDMT SUBQ TIS 1ST 20SQCM/<: CPT | Performed by: SURGERY

## 2019-10-14 PROCEDURE — 83735 ASSAY OF MAGNESIUM: CPT

## 2019-10-14 PROCEDURE — 0JBR0ZZ EXCISION OF LEFT FOOT SUBCUTANEOUS TISSUE AND FASCIA, OPEN APPROACH: ICD-10-PCS | Performed by: SURGERY

## 2019-10-14 PROCEDURE — 93005 ELECTROCARDIOGRAM TRACING: CPT | Performed by: EMERGENCY MEDICINE

## 2019-10-14 RX ORDER — CHOLECALCIFEROL (VITAMIN D3) 50 MCG
2000 TABLET ORAL DAILY
Status: DISCONTINUED | OUTPATIENT
Start: 2019-10-14 | End: 2019-10-19 | Stop reason: HOSPADM

## 2019-10-14 RX ORDER — DILTIAZEM HYDROCHLORIDE 5 MG/ML
15 INJECTION INTRAVENOUS ONCE
Status: COMPLETED | OUTPATIENT
Start: 2019-10-14 | End: 2019-10-14

## 2019-10-14 RX ORDER — ACETAMINOPHEN 325 MG/1
650 TABLET ORAL EVERY 6 HOURS PRN
Status: DISCONTINUED | OUTPATIENT
Start: 2019-10-14 | End: 2019-10-19 | Stop reason: HOSPADM

## 2019-10-14 RX ORDER — CILOSTAZOL 100 MG/1
100 TABLET ORAL 2 TIMES DAILY
Status: DISCONTINUED | OUTPATIENT
Start: 2019-10-14 | End: 2019-10-19 | Stop reason: HOSPADM

## 2019-10-14 RX ORDER — LIDOCAINE HYDROCHLORIDE 20 MG/ML
JELLY TOPICAL ONCE
Status: DISCONTINUED | OUTPATIENT
Start: 2019-10-14 | End: 2019-10-15 | Stop reason: HOSPADM

## 2019-10-14 RX ORDER — DOXYCYCLINE HYCLATE 100 MG/1
100 CAPSULE ORAL 2 TIMES DAILY
Status: DISCONTINUED | OUTPATIENT
Start: 2019-10-14 | End: 2019-10-19 | Stop reason: HOSPADM

## 2019-10-14 RX ORDER — SIMVASTATIN 10 MG
10 TABLET ORAL DAILY
Status: DISCONTINUED | OUTPATIENT
Start: 2019-10-14 | End: 2019-10-19 | Stop reason: HOSPADM

## 2019-10-14 RX ORDER — ASPIRIN 81 MG/1
81 TABLET ORAL DAILY
Status: DISCONTINUED | OUTPATIENT
Start: 2019-10-14 | End: 2019-10-15

## 2019-10-14 RX ADMIN — DILTIAZEM HYDROCHLORIDE 15 MG: 5 INJECTION INTRAVENOUS at 21:47

## 2019-10-14 RX ADMIN — APIXABAN 2.5 MG: 2.5 TABLET, FILM COATED ORAL at 21:01

## 2019-10-14 RX ADMIN — DILTIAZEM HYDROCHLORIDE 25 MG: 5 INJECTION INTRAVENOUS at 14:52

## 2019-10-14 RX ADMIN — DEXTROSE MONOHYDRATE 5 MG/HR: 50 INJECTION, SOLUTION INTRAVENOUS at 21:57

## 2019-10-14 RX ADMIN — DOXYCYCLINE HYCLATE 100 MG: 100 CAPSULE ORAL at 21:01

## 2019-10-14 RX ADMIN — CILOSTAZOL 100 MG: 100 TABLET ORAL at 21:01

## 2019-10-15 LAB
ALBUMIN SERPL-MCNC: 3 G/DL (ref 3.5–5.2)
ALP BLD-CCNC: 56 U/L (ref 35–104)
ALT SERPL-CCNC: 7 U/L (ref 0–32)
ANION GAP SERPL CALCULATED.3IONS-SCNC: 12 MMOL/L (ref 7–16)
AST SERPL-CCNC: 13 U/L (ref 0–31)
BILIRUB SERPL-MCNC: 0.5 MG/DL (ref 0–1.2)
BUN BLDV-MCNC: 41 MG/DL (ref 8–23)
C-REACTIVE PROTEIN: 0.3 MG/DL (ref 0–0.4)
CALCIUM SERPL-MCNC: 9 MG/DL (ref 8.6–10.2)
CHLORIDE BLD-SCNC: 103 MMOL/L (ref 98–107)
CO2: 21 MMOL/L (ref 22–29)
CREAT SERPL-MCNC: 1.5 MG/DL (ref 0.5–1)
GFR AFRICAN AMERICAN: 39
GFR NON-AFRICAN AMERICAN: 32 ML/MIN/1.73
GLUCOSE BLD-MCNC: 140 MG/DL (ref 74–99)
HBA1C MFR BLD: 7 % (ref 4–5.6)
HCT VFR BLD CALC: 26.6 % (ref 34–48)
HEMOGLOBIN: 8.4 G/DL (ref 11.5–15.5)
MCH RBC QN AUTO: 28.9 PG (ref 26–35)
MCHC RBC AUTO-ENTMCNC: 31.6 % (ref 32–34.5)
MCV RBC AUTO: 91.4 FL (ref 80–99.9)
PDW BLD-RTO: 15.1 FL (ref 11.5–15)
PLATELET # BLD: 215 E9/L (ref 130–450)
PMV BLD AUTO: 10.3 FL (ref 7–12)
POTASSIUM SERPL-SCNC: 5.1 MMOL/L (ref 3.5–5)
RBC # BLD: 2.91 E12/L (ref 3.5–5.5)
SEDIMENTATION RATE, ERYTHROCYTE: 21 MM/HR (ref 0–20)
SODIUM BLD-SCNC: 136 MMOL/L (ref 132–146)
T4 TOTAL: 5.3 MCG/DL (ref 4.5–11.7)
TOTAL PROTEIN: 6 G/DL (ref 6.4–8.3)
TSH SERPL DL<=0.05 MIU/L-ACNC: 3.04 UIU/ML (ref 0.27–4.2)
WBC # BLD: 10.9 E9/L (ref 4.5–11.5)

## 2019-10-15 PROCEDURE — 80053 COMPREHEN METABOLIC PANEL: CPT

## 2019-10-15 PROCEDURE — 83036 HEMOGLOBIN GLYCOSYLATED A1C: CPT

## 2019-10-15 PROCEDURE — 6360000002 HC RX W HCPCS: Performed by: INTERNAL MEDICINE

## 2019-10-15 PROCEDURE — 86140 C-REACTIVE PROTEIN: CPT

## 2019-10-15 PROCEDURE — 6370000000 HC RX 637 (ALT 250 FOR IP): Performed by: INTERNAL MEDICINE

## 2019-10-15 PROCEDURE — 36415 COLL VENOUS BLD VENIPUNCTURE: CPT

## 2019-10-15 PROCEDURE — 2060000000 HC ICU INTERMEDIATE R&B

## 2019-10-15 PROCEDURE — 85027 COMPLETE CBC AUTOMATED: CPT

## 2019-10-15 PROCEDURE — 99223 1ST HOSP IP/OBS HIGH 75: CPT | Performed by: INTERNAL MEDICINE

## 2019-10-15 PROCEDURE — 84436 ASSAY OF TOTAL THYROXINE: CPT

## 2019-10-15 PROCEDURE — 85651 RBC SED RATE NONAUTOMATED: CPT

## 2019-10-15 PROCEDURE — 84443 ASSAY THYROID STIM HORMONE: CPT

## 2019-10-15 RX ORDER — MAGNESIUM SULFATE IN WATER 40 MG/ML
2 INJECTION, SOLUTION INTRAVENOUS ONCE
Status: COMPLETED | OUTPATIENT
Start: 2019-10-15 | End: 2019-10-15

## 2019-10-15 RX ORDER — TRAMADOL HYDROCHLORIDE 50 MG/1
50 TABLET ORAL EVERY 6 HOURS PRN
Status: DISCONTINUED | OUTPATIENT
Start: 2019-10-15 | End: 2019-10-19 | Stop reason: HOSPADM

## 2019-10-15 RX ORDER — METOPROLOL TARTRATE 50 MG/1
50 TABLET, FILM COATED ORAL 2 TIMES DAILY
Status: DISCONTINUED | OUTPATIENT
Start: 2019-10-15 | End: 2019-10-16

## 2019-10-15 RX ADMIN — APIXABAN 2.5 MG: 2.5 TABLET, FILM COATED ORAL at 20:39

## 2019-10-15 RX ADMIN — DOXYCYCLINE HYCLATE 100 MG: 100 CAPSULE ORAL at 09:00

## 2019-10-15 RX ADMIN — Medication 2000 UNITS: at 09:00

## 2019-10-15 RX ADMIN — ACETAMINOPHEN 650 MG: 325 TABLET, FILM COATED ORAL at 00:38

## 2019-10-15 RX ADMIN — APIXABAN 2.5 MG: 2.5 TABLET, FILM COATED ORAL at 09:01

## 2019-10-15 RX ADMIN — MAGNESIUM SULFATE 2 G: 2 INJECTION INTRAVENOUS at 09:01

## 2019-10-15 RX ADMIN — SIMVASTATIN 10 MG: 10 TABLET, FILM COATED ORAL at 09:00

## 2019-10-15 RX ADMIN — CILOSTAZOL 100 MG: 100 TABLET ORAL at 09:00

## 2019-10-15 RX ADMIN — METOPROLOL TARTRATE 50 MG: 50 TABLET ORAL at 20:39

## 2019-10-15 RX ADMIN — DOXYCYCLINE HYCLATE 100 MG: 100 CAPSULE ORAL at 20:39

## 2019-10-15 RX ADMIN — CILOSTAZOL 100 MG: 100 TABLET ORAL at 20:39

## 2019-10-15 RX ADMIN — METOPROLOL TARTRATE 50 MG: 50 TABLET ORAL at 09:01

## 2019-10-15 ASSESSMENT — PAIN SCALES - GENERAL
PAINLEVEL_OUTOF10: 1
PAINLEVEL_OUTOF10: 0
PAINLEVEL_OUTOF10: 10
PAINLEVEL_OUTOF10: 0

## 2019-10-16 LAB
ALBUMIN SERPL-MCNC: 3 G/DL (ref 3.5–5.2)
ALP BLD-CCNC: 59 U/L (ref 35–104)
ALT SERPL-CCNC: 9 U/L (ref 0–32)
ANION GAP SERPL CALCULATED.3IONS-SCNC: 10 MMOL/L (ref 7–16)
AST SERPL-CCNC: 11 U/L (ref 0–31)
BILIRUB SERPL-MCNC: 0.5 MG/DL (ref 0–1.2)
BUN BLDV-MCNC: 45 MG/DL (ref 8–23)
CALCIUM SERPL-MCNC: 9.1 MG/DL (ref 8.6–10.2)
CHLORIDE BLD-SCNC: 104 MMOL/L (ref 98–107)
CO2: 22 MMOL/L (ref 22–29)
CREAT SERPL-MCNC: 1.6 MG/DL (ref 0.5–1)
GFR AFRICAN AMERICAN: 36
GFR NON-AFRICAN AMERICAN: 30 ML/MIN/1.73
GLUCOSE BLD-MCNC: 139 MG/DL (ref 74–99)
HCT VFR BLD CALC: 26.8 % (ref 34–48)
HEMOGLOBIN: 8.3 G/DL (ref 11.5–15.5)
MAGNESIUM: 1.9 MG/DL (ref 1.6–2.6)
MCH RBC QN AUTO: 28 PG (ref 26–35)
MCHC RBC AUTO-ENTMCNC: 31 % (ref 32–34.5)
MCV RBC AUTO: 90.5 FL (ref 80–99.9)
PDW BLD-RTO: 15.3 FL (ref 11.5–15)
PLATELET # BLD: 231 E9/L (ref 130–450)
PMV BLD AUTO: 10.4 FL (ref 7–12)
POTASSIUM SERPL-SCNC: 5.1 MMOL/L (ref 3.5–5)
RBC # BLD: 2.96 E12/L (ref 3.5–5.5)
SODIUM BLD-SCNC: 136 MMOL/L (ref 132–146)
TOTAL PROTEIN: 6.1 G/DL (ref 6.4–8.3)
WBC # BLD: 11.3 E9/L (ref 4.5–11.5)

## 2019-10-16 PROCEDURE — 36415 COLL VENOUS BLD VENIPUNCTURE: CPT

## 2019-10-16 PROCEDURE — 85027 COMPLETE CBC AUTOMATED: CPT

## 2019-10-16 PROCEDURE — 80053 COMPREHEN METABOLIC PANEL: CPT

## 2019-10-16 PROCEDURE — 6370000000 HC RX 637 (ALT 250 FOR IP): Performed by: INTERNAL MEDICINE

## 2019-10-16 PROCEDURE — 83735 ASSAY OF MAGNESIUM: CPT

## 2019-10-16 PROCEDURE — 2060000000 HC ICU INTERMEDIATE R&B

## 2019-10-16 PROCEDURE — 99233 SBSQ HOSP IP/OBS HIGH 50: CPT | Performed by: INTERNAL MEDICINE

## 2019-10-16 RX ORDER — METOPROLOL TARTRATE 50 MG/1
100 TABLET, FILM COATED ORAL 2 TIMES DAILY
Status: DISCONTINUED | OUTPATIENT
Start: 2019-10-16 | End: 2019-10-17

## 2019-10-16 RX ORDER — METOPROLOL TARTRATE 50 MG/1
50 TABLET, FILM COATED ORAL ONCE
Status: COMPLETED | OUTPATIENT
Start: 2019-10-16 | End: 2019-10-16

## 2019-10-16 RX ADMIN — CILOSTAZOL 100 MG: 100 TABLET ORAL at 20:22

## 2019-10-16 RX ADMIN — Medication 2000 UNITS: at 08:41

## 2019-10-16 RX ADMIN — SIMVASTATIN 10 MG: 10 TABLET, FILM COATED ORAL at 08:41

## 2019-10-16 RX ADMIN — APIXABAN 2.5 MG: 2.5 TABLET, FILM COATED ORAL at 20:22

## 2019-10-16 RX ADMIN — APIXABAN 2.5 MG: 2.5 TABLET, FILM COATED ORAL at 08:41

## 2019-10-16 RX ADMIN — METOPROLOL TARTRATE 50 MG: 50 TABLET ORAL at 14:40

## 2019-10-16 RX ADMIN — DOXYCYCLINE HYCLATE 100 MG: 100 CAPSULE ORAL at 20:21

## 2019-10-16 RX ADMIN — METOPROLOL TARTRATE 50 MG: 50 TABLET ORAL at 08:41

## 2019-10-16 RX ADMIN — CILOSTAZOL 100 MG: 100 TABLET ORAL at 08:41

## 2019-10-16 RX ADMIN — DOXYCYCLINE HYCLATE 100 MG: 100 CAPSULE ORAL at 08:41

## 2019-10-16 RX ADMIN — METOPROLOL TARTRATE 100 MG: 50 TABLET ORAL at 20:22

## 2019-10-16 ASSESSMENT — PAIN SCALES - GENERAL
PAINLEVEL_OUTOF10: 0

## 2019-10-17 LAB
ALBUMIN SERPL-MCNC: 3.1 G/DL (ref 3.5–5.2)
ALP BLD-CCNC: 61 U/L (ref 35–104)
ALT SERPL-CCNC: 8 U/L (ref 0–32)
ANION GAP SERPL CALCULATED.3IONS-SCNC: 14 MMOL/L (ref 7–16)
AST SERPL-CCNC: 14 U/L (ref 0–31)
BILIRUB SERPL-MCNC: 0.5 MG/DL (ref 0–1.2)
BUN BLDV-MCNC: 41 MG/DL (ref 8–23)
CALCIUM SERPL-MCNC: 9.1 MG/DL (ref 8.6–10.2)
CHLORIDE BLD-SCNC: 102 MMOL/L (ref 98–107)
CO2: 20 MMOL/L (ref 22–29)
CREAT SERPL-MCNC: 1.5 MG/DL (ref 0.5–1)
GFR AFRICAN AMERICAN: 39
GFR NON-AFRICAN AMERICAN: 32 ML/MIN/1.73
GLUCOSE BLD-MCNC: 161 MG/DL (ref 74–99)
HCT VFR BLD CALC: 28.5 % (ref 34–48)
HEMOGLOBIN: 9.1 G/DL (ref 11.5–15.5)
LV EF: 53 %
LVEF MODALITY: NORMAL
MCH RBC QN AUTO: 28.7 PG (ref 26–35)
MCHC RBC AUTO-ENTMCNC: 31.9 % (ref 32–34.5)
MCV RBC AUTO: 89.9 FL (ref 80–99.9)
PDW BLD-RTO: 15.4 FL (ref 11.5–15)
PLATELET # BLD: 254 E9/L (ref 130–450)
PMV BLD AUTO: 10.3 FL (ref 7–12)
POTASSIUM SERPL-SCNC: 4.3 MMOL/L (ref 3.5–5)
RBC # BLD: 3.17 E12/L (ref 3.5–5.5)
SODIUM BLD-SCNC: 136 MMOL/L (ref 132–146)
TOTAL PROTEIN: 6.4 G/DL (ref 6.4–8.3)
WBC # BLD: 12.9 E9/L (ref 4.5–11.5)

## 2019-10-17 PROCEDURE — 6370000000 HC RX 637 (ALT 250 FOR IP): Performed by: INTERNAL MEDICINE

## 2019-10-17 PROCEDURE — 36415 COLL VENOUS BLD VENIPUNCTURE: CPT

## 2019-10-17 PROCEDURE — 2580000003 HC RX 258

## 2019-10-17 PROCEDURE — 80053 COMPREHEN METABOLIC PANEL: CPT

## 2019-10-17 PROCEDURE — 6360000002 HC RX W HCPCS: Performed by: INTERNAL MEDICINE

## 2019-10-17 PROCEDURE — 93306 TTE W/DOPPLER COMPLETE: CPT

## 2019-10-17 PROCEDURE — 99233 SBSQ HOSP IP/OBS HIGH 50: CPT | Performed by: INTERNAL MEDICINE

## 2019-10-17 PROCEDURE — 2060000000 HC ICU INTERMEDIATE R&B

## 2019-10-17 PROCEDURE — 85027 COMPLETE CBC AUTOMATED: CPT

## 2019-10-17 RX ORDER — DIGOXIN 0.25 MG/ML
250 INJECTION INTRAMUSCULAR; INTRAVENOUS ONCE
Status: COMPLETED | OUTPATIENT
Start: 2019-10-17 | End: 2019-10-17

## 2019-10-17 RX ORDER — SODIUM CHLORIDE 0.9 % (FLUSH) 0.9 %
SYRINGE (ML) INJECTION
Status: COMPLETED
Start: 2019-10-17 | End: 2019-10-17

## 2019-10-17 RX ORDER — METOPROLOL SUCCINATE 100 MG/1
100 TABLET, EXTENDED RELEASE ORAL 2 TIMES DAILY
Status: DISCONTINUED | OUTPATIENT
Start: 2019-10-17 | End: 2019-10-19 | Stop reason: HOSPADM

## 2019-10-17 RX ADMIN — CILOSTAZOL 100 MG: 100 TABLET ORAL at 20:23

## 2019-10-17 RX ADMIN — METOPROLOL SUCCINATE 100 MG: 100 TABLET, EXTENDED RELEASE ORAL at 20:24

## 2019-10-17 RX ADMIN — SIMVASTATIN 10 MG: 10 TABLET, FILM COATED ORAL at 12:01

## 2019-10-17 RX ADMIN — Medication 10 ML: at 12:01

## 2019-10-17 RX ADMIN — APIXABAN 2.5 MG: 2.5 TABLET, FILM COATED ORAL at 20:24

## 2019-10-17 RX ADMIN — DOXYCYCLINE HYCLATE 100 MG: 100 CAPSULE ORAL at 12:01

## 2019-10-17 RX ADMIN — DIGOXIN 250 MCG: 0.25 INJECTION INTRAMUSCULAR; INTRAVENOUS at 12:00

## 2019-10-17 RX ADMIN — Medication 2000 UNITS: at 12:00

## 2019-10-17 RX ADMIN — APIXABAN 2.5 MG: 2.5 TABLET, FILM COATED ORAL at 12:00

## 2019-10-17 RX ADMIN — TRAMADOL HYDROCHLORIDE 50 MG: 50 TABLET ORAL at 02:18

## 2019-10-17 RX ADMIN — METOPROLOL SUCCINATE 100 MG: 100 TABLET, EXTENDED RELEASE ORAL at 11:59

## 2019-10-17 RX ADMIN — DOXYCYCLINE HYCLATE 100 MG: 100 CAPSULE ORAL at 20:23

## 2019-10-17 RX ADMIN — CILOSTAZOL 100 MG: 100 TABLET ORAL at 12:01

## 2019-10-17 ASSESSMENT — PAIN SCALES - GENERAL
PAINLEVEL_OUTOF10: 0
PAINLEVEL_OUTOF10: 6

## 2019-10-18 LAB
ALBUMIN SERPL-MCNC: 3.2 G/DL (ref 3.5–5.2)
ALP BLD-CCNC: 63 U/L (ref 35–104)
ALT SERPL-CCNC: 10 U/L (ref 0–32)
ANION GAP SERPL CALCULATED.3IONS-SCNC: 13 MMOL/L (ref 7–16)
AST SERPL-CCNC: 16 U/L (ref 0–31)
BILIRUB SERPL-MCNC: 0.5 MG/DL (ref 0–1.2)
BUN BLDV-MCNC: 33 MG/DL (ref 8–23)
CALCIUM SERPL-MCNC: 9.3 MG/DL (ref 8.6–10.2)
CHLORIDE BLD-SCNC: 102 MMOL/L (ref 98–107)
CO2: 20 MMOL/L (ref 22–29)
CREAT SERPL-MCNC: 1.2 MG/DL (ref 0.5–1)
GFR AFRICAN AMERICAN: 51
GFR NON-AFRICAN AMERICAN: 42 ML/MIN/1.73
GLUCOSE BLD-MCNC: 143 MG/DL (ref 74–99)
HCT VFR BLD CALC: 30.9 % (ref 34–48)
HEMOGLOBIN: 9.9 G/DL (ref 11.5–15.5)
MCH RBC QN AUTO: 28.6 PG (ref 26–35)
MCHC RBC AUTO-ENTMCNC: 32 % (ref 32–34.5)
MCV RBC AUTO: 89.3 FL (ref 80–99.9)
PDW BLD-RTO: 15.1 FL (ref 11.5–15)
PLATELET # BLD: 263 E9/L (ref 130–450)
PMV BLD AUTO: 9.9 FL (ref 7–12)
POTASSIUM SERPL-SCNC: 4.6 MMOL/L (ref 3.5–5)
RBC # BLD: 3.46 E12/L (ref 3.5–5.5)
SODIUM BLD-SCNC: 135 MMOL/L (ref 132–146)
TOTAL PROTEIN: 6.6 G/DL (ref 6.4–8.3)
WBC # BLD: 14.9 E9/L (ref 4.5–11.5)

## 2019-10-18 PROCEDURE — 36415 COLL VENOUS BLD VENIPUNCTURE: CPT

## 2019-10-18 PROCEDURE — 6370000000 HC RX 637 (ALT 250 FOR IP): Performed by: INTERNAL MEDICINE

## 2019-10-18 PROCEDURE — 99222 1ST HOSP IP/OBS MODERATE 55: CPT | Performed by: SURGERY

## 2019-10-18 PROCEDURE — 80053 COMPREHEN METABOLIC PANEL: CPT

## 2019-10-18 PROCEDURE — 85027 COMPLETE CBC AUTOMATED: CPT

## 2019-10-18 PROCEDURE — 2060000000 HC ICU INTERMEDIATE R&B

## 2019-10-18 RX ORDER — SODIUM CHLORIDE 0.9 % (FLUSH) 0.9 %
SYRINGE (ML) INJECTION
Status: DISPENSED
Start: 2019-10-18 | End: 2019-10-19

## 2019-10-18 RX ADMIN — CILOSTAZOL 100 MG: 100 TABLET ORAL at 08:57

## 2019-10-18 RX ADMIN — DOXYCYCLINE HYCLATE 100 MG: 100 CAPSULE ORAL at 20:36

## 2019-10-18 RX ADMIN — TRAMADOL HYDROCHLORIDE 50 MG: 50 TABLET ORAL at 20:43

## 2019-10-18 RX ADMIN — APIXABAN 2.5 MG: 2.5 TABLET, FILM COATED ORAL at 20:37

## 2019-10-18 RX ADMIN — METOPROLOL SUCCINATE 100 MG: 100 TABLET, EXTENDED RELEASE ORAL at 21:01

## 2019-10-18 RX ADMIN — METOPROLOL SUCCINATE 100 MG: 100 TABLET, EXTENDED RELEASE ORAL at 08:57

## 2019-10-18 RX ADMIN — SIMVASTATIN 10 MG: 10 TABLET, FILM COATED ORAL at 08:58

## 2019-10-18 RX ADMIN — DOXYCYCLINE HYCLATE 100 MG: 100 CAPSULE ORAL at 08:58

## 2019-10-18 RX ADMIN — CILOSTAZOL 100 MG: 100 TABLET ORAL at 20:37

## 2019-10-18 RX ADMIN — APIXABAN 2.5 MG: 2.5 TABLET, FILM COATED ORAL at 08:57

## 2019-10-18 RX ADMIN — Medication 2000 UNITS: at 08:57

## 2019-10-18 ASSESSMENT — PAIN SCALES - GENERAL
PAINLEVEL_OUTOF10: 0
PAINLEVEL_OUTOF10: 10

## 2019-10-18 ASSESSMENT — PAIN DESCRIPTION - PAIN TYPE: TYPE: CHRONIC PAIN

## 2019-10-18 ASSESSMENT — PAIN DESCRIPTION - DESCRIPTORS: DESCRIPTORS: ACHING;DISCOMFORT;TENDER

## 2019-10-18 ASSESSMENT — PAIN DESCRIPTION - ORIENTATION: ORIENTATION: LEFT

## 2019-10-18 ASSESSMENT — PAIN DESCRIPTION - ONSET: ONSET: GRADUAL

## 2019-10-18 ASSESSMENT — PAIN DESCRIPTION - FREQUENCY: FREQUENCY: INTERMITTENT

## 2019-10-18 ASSESSMENT — PAIN DESCRIPTION - LOCATION: LOCATION: FOOT

## 2019-10-19 VITALS
SYSTOLIC BLOOD PRESSURE: 123 MMHG | BODY MASS INDEX: 20.99 KG/M2 | WEIGHT: 126 LBS | DIASTOLIC BLOOD PRESSURE: 58 MMHG | HEART RATE: 59 BPM | OXYGEN SATURATION: 98 % | HEIGHT: 65 IN | TEMPERATURE: 98.2 F | RESPIRATION RATE: 18 BRPM

## 2019-10-19 LAB
ALBUMIN SERPL-MCNC: 3.1 G/DL (ref 3.5–5.2)
ALP BLD-CCNC: 64 U/L (ref 35–104)
ALT SERPL-CCNC: 12 U/L (ref 0–32)
ANION GAP SERPL CALCULATED.3IONS-SCNC: 14 MMOL/L (ref 7–16)
AST SERPL-CCNC: 18 U/L (ref 0–31)
BILIRUB SERPL-MCNC: 0.7 MG/DL (ref 0–1.2)
BUN BLDV-MCNC: 33 MG/DL (ref 8–23)
CALCIUM SERPL-MCNC: 9.4 MG/DL (ref 8.6–10.2)
CHLORIDE BLD-SCNC: 99 MMOL/L (ref 98–107)
CO2: 22 MMOL/L (ref 22–29)
CREAT SERPL-MCNC: 1.3 MG/DL (ref 0.5–1)
GFR AFRICAN AMERICAN: 46
GFR NON-AFRICAN AMERICAN: 38 ML/MIN/1.73
GLUCOSE BLD-MCNC: 139 MG/DL (ref 74–99)
HCT VFR BLD CALC: 33.6 % (ref 34–48)
HEMOGLOBIN: 10.4 G/DL (ref 11.5–15.5)
MCH RBC QN AUTO: 27.9 PG (ref 26–35)
MCHC RBC AUTO-ENTMCNC: 31 % (ref 32–34.5)
MCV RBC AUTO: 90.1 FL (ref 80–99.9)
PDW BLD-RTO: 14.9 FL (ref 11.5–15)
PLATELET # BLD: 259 E9/L (ref 130–450)
PMV BLD AUTO: 10.1 FL (ref 7–12)
POTASSIUM SERPL-SCNC: 4.2 MMOL/L (ref 3.5–5)
RBC # BLD: 3.73 E12/L (ref 3.5–5.5)
SODIUM BLD-SCNC: 135 MMOL/L (ref 132–146)
TOTAL PROTEIN: 6.6 G/DL (ref 6.4–8.3)
WBC # BLD: 15.3 E9/L (ref 4.5–11.5)

## 2019-10-19 PROCEDURE — 85027 COMPLETE CBC AUTOMATED: CPT

## 2019-10-19 PROCEDURE — 6370000000 HC RX 637 (ALT 250 FOR IP): Performed by: INTERNAL MEDICINE

## 2019-10-19 PROCEDURE — 6370000000 HC RX 637 (ALT 250 FOR IP): Performed by: SURGERY

## 2019-10-19 PROCEDURE — 80053 COMPREHEN METABOLIC PANEL: CPT

## 2019-10-19 PROCEDURE — 36415 COLL VENOUS BLD VENIPUNCTURE: CPT

## 2019-10-19 RX ORDER — METOPROLOL SUCCINATE 100 MG/1
100 TABLET, EXTENDED RELEASE ORAL 2 TIMES DAILY
Qty: 60 TABLET | Refills: 3 | Status: SHIPPED | OUTPATIENT
Start: 2019-10-19 | End: 2019-12-02

## 2019-10-19 RX ORDER — TRAMADOL HYDROCHLORIDE 50 MG/1
50 TABLET ORAL EVERY 6 HOURS PRN
Qty: 28 TABLET | Refills: 0
Start: 2019-10-19 | End: 2019-10-26

## 2019-10-19 RX ORDER — ONDANSETRON 4 MG/1
4 TABLET, ORALLY DISINTEGRATING ORAL 4 TIMES DAILY PRN
Status: DISCONTINUED | OUTPATIENT
Start: 2019-10-19 | End: 2019-10-19 | Stop reason: HOSPADM

## 2019-10-19 RX ORDER — CILOSTAZOL 100 MG/1
100 TABLET ORAL 2 TIMES DAILY
Qty: 60 TABLET | Refills: 3
Start: 2019-10-19 | End: 2019-10-28 | Stop reason: ALTCHOICE

## 2019-10-19 RX ORDER — ONDANSETRON 4 MG/1
4 TABLET, ORALLY DISINTEGRATING ORAL 4 TIMES DAILY PRN
Qty: 30 TABLET | Refills: 1 | Status: SHIPPED | OUTPATIENT
Start: 2019-10-19 | End: 2019-10-28 | Stop reason: ALTCHOICE

## 2019-10-19 RX ADMIN — METOPROLOL SUCCINATE 100 MG: 100 TABLET, EXTENDED RELEASE ORAL at 10:30

## 2019-10-19 RX ADMIN — DOXYCYCLINE HYCLATE 100 MG: 100 CAPSULE ORAL at 10:33

## 2019-10-19 RX ADMIN — SIMVASTATIN 10 MG: 10 TABLET, FILM COATED ORAL at 10:30

## 2019-10-19 RX ADMIN — COLLAGENASE SANTYL: 250 OINTMENT TOPICAL at 10:34

## 2019-10-19 RX ADMIN — TRAMADOL HYDROCHLORIDE 50 MG: 50 TABLET ORAL at 10:30

## 2019-10-19 RX ADMIN — CILOSTAZOL 100 MG: 100 TABLET ORAL at 10:29

## 2019-10-19 RX ADMIN — APIXABAN 2.5 MG: 2.5 TABLET, FILM COATED ORAL at 10:30

## 2019-10-19 RX ADMIN — Medication 2000 UNITS: at 10:29

## 2019-10-19 ASSESSMENT — PAIN DESCRIPTION - ORIENTATION: ORIENTATION: LEFT

## 2019-10-19 ASSESSMENT — PAIN DESCRIPTION - DESCRIPTORS: DESCRIPTORS: ACHING;DULL

## 2019-10-19 ASSESSMENT — PAIN DESCRIPTION - LOCATION: LOCATION: FOOT

## 2019-10-19 ASSESSMENT — PAIN DESCRIPTION - PAIN TYPE: TYPE: CHRONIC PAIN

## 2019-10-19 ASSESSMENT — PAIN SCALES - GENERAL
PAINLEVEL_OUTOF10: 6
PAINLEVEL_OUTOF10: 10

## 2019-10-19 ASSESSMENT — PAIN DESCRIPTION - FREQUENCY: FREQUENCY: INTERMITTENT

## 2019-10-19 ASSESSMENT — PAIN DESCRIPTION - ONSET: ONSET: ON-GOING

## 2019-10-20 ENCOUNTER — CARE COORDINATION (OUTPATIENT)
Dept: CASE MANAGEMENT | Age: 84
End: 2019-10-20

## 2019-10-21 ENCOUNTER — TELEPHONE (OUTPATIENT)
Dept: CARDIOLOGY CLINIC | Age: 84
End: 2019-10-21

## 2019-10-21 ENCOUNTER — HOSPITAL ENCOUNTER (OUTPATIENT)
Dept: WOUND CARE | Age: 84
Discharge: HOME | End: 2019-10-21
Payer: MEDICARE

## 2019-10-28 ENCOUNTER — CARE COORDINATION (OUTPATIENT)
Dept: CASE MANAGEMENT | Age: 84
End: 2019-10-28

## 2019-10-28 ENCOUNTER — HOSPITAL ENCOUNTER (OUTPATIENT)
Dept: WOUND CARE | Age: 84
Discharge: HOME | End: 2019-10-28
Payer: MEDICARE

## 2019-10-28 VITALS
DIASTOLIC BLOOD PRESSURE: 62 MMHG | SYSTOLIC BLOOD PRESSURE: 98 MMHG | TEMPERATURE: 97.5 F | WEIGHT: 126 LBS | HEIGHT: 65 IN | BODY MASS INDEX: 20.99 KG/M2 | RESPIRATION RATE: 16 BRPM | HEART RATE: 80 BPM

## 2019-10-28 DIAGNOSIS — I70.244 ATHEROSCLEROSIS OF NATIVE ARTERY OF LEFT LOWER EXTREMITY WITH ULCERATION OF MIDFOOT (HCC): ICD-10-CM

## 2019-10-28 DIAGNOSIS — L97.522 ULCERATED, FOOT, LEFT, WITH FAT LAYER EXPOSED (HCC): ICD-10-CM

## 2019-10-28 PROBLEM — L97.521 ULCERATED, FOOT, LEFT, LIMITED TO BREAKDOWN OF SKIN (HCC): Status: RESOLVED | Noted: 2019-09-23 | Resolved: 2019-10-28

## 2019-10-28 PROCEDURE — 11042 DBRDMT SUBQ TIS 1ST 20SQCM/<: CPT

## 2019-10-28 PROCEDURE — 11042 DBRDMT SUBQ TIS 1ST 20SQCM/<: CPT | Performed by: SURGERY

## 2019-11-04 ENCOUNTER — HOSPITAL ENCOUNTER (OUTPATIENT)
Dept: WOUND CARE | Age: 84
Discharge: HOME | End: 2019-11-04
Payer: MEDICARE

## 2019-11-04 VITALS
WEIGHT: 120 LBS | DIASTOLIC BLOOD PRESSURE: 54 MMHG | BODY MASS INDEX: 19.99 KG/M2 | RESPIRATION RATE: 24 BRPM | HEART RATE: 88 BPM | SYSTOLIC BLOOD PRESSURE: 98 MMHG | HEIGHT: 65 IN | TEMPERATURE: 98.8 F

## 2019-11-04 PROCEDURE — 11042 DBRDMT SUBQ TIS 1ST 20SQCM/<: CPT

## 2019-11-04 PROCEDURE — 11042 DBRDMT SUBQ TIS 1ST 20SQCM/<: CPT | Performed by: SURGERY

## 2019-11-04 RX ORDER — LIDOCAINE HYDROCHLORIDE 20 MG/ML
JELLY TOPICAL ONCE
Status: DISCONTINUED | OUTPATIENT
Start: 2019-11-04 | End: 2019-11-05 | Stop reason: HOSPADM

## 2019-11-11 ENCOUNTER — HOSPITAL ENCOUNTER (OUTPATIENT)
Dept: WOUND CARE | Age: 84
Discharge: HOME | End: 2019-11-11
Payer: MEDICARE

## 2019-11-11 VITALS
WEIGHT: 93.5 LBS | TEMPERATURE: 96.6 F | DIASTOLIC BLOOD PRESSURE: 58 MMHG | BODY MASS INDEX: 15.58 KG/M2 | RESPIRATION RATE: 16 BRPM | HEART RATE: 144 BPM | HEIGHT: 65 IN | SYSTOLIC BLOOD PRESSURE: 98 MMHG

## 2019-11-11 DIAGNOSIS — L97.522 ULCERATED, FOOT, LEFT, WITH FAT LAYER EXPOSED (HCC): ICD-10-CM

## 2019-11-11 DIAGNOSIS — I70.244 ATHEROSCLEROSIS OF NATIVE ARTERY OF LEFT LOWER EXTREMITY WITH ULCERATION OF MIDFOOT (HCC): ICD-10-CM

## 2019-11-11 PROCEDURE — 11042 DBRDMT SUBQ TIS 1ST 20SQCM/<: CPT

## 2019-11-11 PROCEDURE — 11042 DBRDMT SUBQ TIS 1ST 20SQCM/<: CPT | Performed by: SURGERY

## 2019-11-11 RX ORDER — LIDOCAINE HYDROCHLORIDE 20 MG/ML
JELLY TOPICAL PRN
Status: DISCONTINUED | OUTPATIENT
Start: 2019-11-11 | End: 2019-11-12 | Stop reason: HOSPADM

## 2019-11-11 RX ORDER — PREDNISONE 10 MG/1
10 TABLET ORAL DAILY
COMMUNITY
End: 2019-11-25 | Stop reason: ALTCHOICE

## 2019-11-13 ENCOUNTER — OFFICE VISIT (OUTPATIENT)
Dept: CARDIOLOGY CLINIC | Age: 84
End: 2019-11-13
Payer: MEDICARE

## 2019-11-13 VITALS
BODY MASS INDEX: 15.58 KG/M2 | WEIGHT: 93.5 LBS | SYSTOLIC BLOOD PRESSURE: 118 MMHG | DIASTOLIC BLOOD PRESSURE: 62 MMHG | HEIGHT: 65 IN | RESPIRATION RATE: 16 BRPM | HEART RATE: 144 BPM

## 2019-11-13 DIAGNOSIS — I73.9 PVD (PERIPHERAL VASCULAR DISEASE) (HCC): ICD-10-CM

## 2019-11-13 DIAGNOSIS — I38 VALVULAR HEART DISEASE: ICD-10-CM

## 2019-11-13 DIAGNOSIS — Z79.01 CHRONIC ANTICOAGULATION: ICD-10-CM

## 2019-11-13 DIAGNOSIS — I48.91 ATRIAL FIBRILLATION WITH RVR (HCC): Primary | ICD-10-CM

## 2019-11-13 DIAGNOSIS — I10 ESSENTIAL HYPERTENSION: ICD-10-CM

## 2019-11-13 PROCEDURE — 99214 OFFICE O/P EST MOD 30 MIN: CPT | Performed by: INTERNAL MEDICINE

## 2019-11-13 PROCEDURE — 4040F PNEUMOC VAC/ADMIN/RCVD: CPT | Performed by: INTERNAL MEDICINE

## 2019-11-13 PROCEDURE — G8484 FLU IMMUNIZE NO ADMIN: HCPCS | Performed by: INTERNAL MEDICINE

## 2019-11-13 PROCEDURE — G8598 ASA/ANTIPLAT THER USED: HCPCS | Performed by: INTERNAL MEDICINE

## 2019-11-13 PROCEDURE — 1111F DSCHRG MED/CURRENT MED MERGE: CPT | Performed by: INTERNAL MEDICINE

## 2019-11-13 PROCEDURE — 1090F PRES/ABSN URINE INCON ASSESS: CPT | Performed by: INTERNAL MEDICINE

## 2019-11-13 PROCEDURE — G8419 CALC BMI OUT NRM PARAM NOF/U: HCPCS | Performed by: INTERNAL MEDICINE

## 2019-11-13 PROCEDURE — 1036F TOBACCO NON-USER: CPT | Performed by: INTERNAL MEDICINE

## 2019-11-13 PROCEDURE — G8427 DOCREV CUR MEDS BY ELIG CLIN: HCPCS | Performed by: INTERNAL MEDICINE

## 2019-11-13 PROCEDURE — 93000 ELECTROCARDIOGRAM COMPLETE: CPT | Performed by: INTERNAL MEDICINE

## 2019-11-13 PROCEDURE — 1123F ACP DISCUSS/DSCN MKR DOCD: CPT | Performed by: INTERNAL MEDICINE

## 2019-11-18 ENCOUNTER — HOSPITAL ENCOUNTER (OUTPATIENT)
Dept: WOUND CARE | Age: 84
Discharge: HOME | End: 2019-11-18
Payer: MEDICARE

## 2019-11-18 VITALS
WEIGHT: 93 LBS | BODY MASS INDEX: 15.49 KG/M2 | HEART RATE: 88 BPM | DIASTOLIC BLOOD PRESSURE: 60 MMHG | HEIGHT: 65 IN | RESPIRATION RATE: 18 BRPM | SYSTOLIC BLOOD PRESSURE: 106 MMHG | TEMPERATURE: 97.5 F

## 2019-11-18 DIAGNOSIS — I70.244 ATHEROSCLEROSIS OF NATIVE ARTERY OF LEFT LOWER EXTREMITY WITH ULCERATION OF MIDFOOT (HCC): ICD-10-CM

## 2019-11-18 DIAGNOSIS — L97.522 ULCERATED, FOOT, LEFT, WITH FAT LAYER EXPOSED (HCC): ICD-10-CM

## 2019-11-18 PROCEDURE — 11042 DBRDMT SUBQ TIS 1ST 20SQCM/<: CPT | Performed by: SURGERY

## 2019-11-18 PROCEDURE — 11042 DBRDMT SUBQ TIS 1ST 20SQCM/<: CPT

## 2019-11-18 RX ORDER — LIDOCAINE HYDROCHLORIDE 20 MG/ML
JELLY TOPICAL ONCE
Status: DISCONTINUED | OUTPATIENT
Start: 2019-11-18 | End: 2019-11-19 | Stop reason: HOSPADM

## 2019-11-21 ENCOUNTER — TELEPHONE (OUTPATIENT)
Dept: CARDIOLOGY CLINIC | Age: 84
End: 2019-11-21

## 2019-11-25 ENCOUNTER — HOSPITAL ENCOUNTER (OUTPATIENT)
Dept: WOUND CARE | Age: 84
Discharge: HOME | End: 2019-11-25
Payer: MEDICARE

## 2019-11-25 VITALS
TEMPERATURE: 98 F | BODY MASS INDEX: 15.49 KG/M2 | RESPIRATION RATE: 18 BRPM | HEIGHT: 65 IN | SYSTOLIC BLOOD PRESSURE: 100 MMHG | DIASTOLIC BLOOD PRESSURE: 64 MMHG | HEART RATE: 80 BPM | WEIGHT: 93 LBS

## 2019-11-25 DIAGNOSIS — I70.244 ATHEROSCLEROSIS OF NATIVE ARTERY OF LEFT LOWER EXTREMITY WITH ULCERATION OF MIDFOOT (HCC): ICD-10-CM

## 2019-11-25 DIAGNOSIS — L97.522 ULCERATED, FOOT, LEFT, WITH FAT LAYER EXPOSED (HCC): ICD-10-CM

## 2019-11-25 PROCEDURE — 11042 DBRDMT SUBQ TIS 1ST 20SQCM/<: CPT

## 2019-11-25 PROCEDURE — 11042 DBRDMT SUBQ TIS 1ST 20SQCM/<: CPT | Performed by: SURGERY

## 2019-11-25 RX ORDER — LIDOCAINE HYDROCHLORIDE 20 MG/ML
JELLY TOPICAL ONCE
Status: DISCONTINUED | OUTPATIENT
Start: 2019-11-25 | End: 2019-11-26 | Stop reason: HOSPADM

## 2019-12-02 ENCOUNTER — HOSPITAL ENCOUNTER (OUTPATIENT)
Dept: WOUND CARE | Age: 84
Discharge: HOME | End: 2019-12-02
Payer: MEDICARE

## 2019-12-02 VITALS
DIASTOLIC BLOOD PRESSURE: 60 MMHG | BODY MASS INDEX: 15.49 KG/M2 | SYSTOLIC BLOOD PRESSURE: 110 MMHG | TEMPERATURE: 97.9 F | RESPIRATION RATE: 16 BRPM | HEIGHT: 65 IN | HEART RATE: 72 BPM | WEIGHT: 93 LBS

## 2019-12-02 DIAGNOSIS — L97.522 ULCERATED, FOOT, LEFT, WITH FAT LAYER EXPOSED (HCC): ICD-10-CM

## 2019-12-02 DIAGNOSIS — I70.244 ATHEROSCLEROSIS OF NATIVE ARTERY OF LEFT LOWER EXTREMITY WITH ULCERATION OF MIDFOOT (HCC): ICD-10-CM

## 2019-12-02 PROCEDURE — 11042 DBRDMT SUBQ TIS 1ST 20SQCM/<: CPT | Performed by: SURGERY

## 2019-12-02 PROCEDURE — 11042 DBRDMT SUBQ TIS 1ST 20SQCM/<: CPT

## 2019-12-02 RX ORDER — LIDOCAINE HYDROCHLORIDE 20 MG/ML
JELLY TOPICAL ONCE
Status: DISCONTINUED | OUTPATIENT
Start: 2019-12-02 | End: 2019-12-03 | Stop reason: HOSPADM

## 2019-12-02 RX ORDER — METOPROLOL SUCCINATE 100 MG/1
100 TABLET, EXTENDED RELEASE ORAL 2 TIMES DAILY
COMMUNITY

## 2019-12-04 DIAGNOSIS — I48.91 ATRIAL FIBRILLATION WITH RVR (HCC): ICD-10-CM

## 2019-12-09 ENCOUNTER — HOSPITAL ENCOUNTER (OUTPATIENT)
Dept: WOUND CARE | Age: 84
Discharge: HOME | End: 2019-12-09
Payer: MEDICARE

## 2019-12-09 VITALS
WEIGHT: 116 LBS | DIASTOLIC BLOOD PRESSURE: 60 MMHG | HEIGHT: 65 IN | SYSTOLIC BLOOD PRESSURE: 98 MMHG | TEMPERATURE: 98 F | RESPIRATION RATE: 16 BRPM | HEART RATE: 80 BPM | BODY MASS INDEX: 19.33 KG/M2

## 2019-12-09 DIAGNOSIS — L97.522 ULCERATED, FOOT, LEFT, WITH FAT LAYER EXPOSED (HCC): ICD-10-CM

## 2019-12-09 DIAGNOSIS — I70.244 ATHEROSCLEROSIS OF NATIVE ARTERY OF LEFT LOWER EXTREMITY WITH ULCERATION OF MIDFOOT (HCC): ICD-10-CM

## 2019-12-09 PROCEDURE — 11042 DBRDMT SUBQ TIS 1ST 20SQCM/<: CPT | Performed by: SURGERY

## 2019-12-09 PROCEDURE — 11042 DBRDMT SUBQ TIS 1ST 20SQCM/<: CPT

## 2019-12-09 RX ORDER — LIDOCAINE HYDROCHLORIDE 20 MG/ML
JELLY TOPICAL ONCE
Status: DISCONTINUED | OUTPATIENT
Start: 2019-12-09 | End: 2019-12-10 | Stop reason: HOSPADM

## 2019-12-16 ENCOUNTER — HOSPITAL ENCOUNTER (OUTPATIENT)
Dept: WOUND CARE | Age: 84
Discharge: HOME | End: 2019-12-16
Payer: MEDICARE

## 2019-12-16 VITALS
DIASTOLIC BLOOD PRESSURE: 58 MMHG | BODY MASS INDEX: 19.33 KG/M2 | HEIGHT: 65 IN | TEMPERATURE: 97.9 F | RESPIRATION RATE: 18 BRPM | SYSTOLIC BLOOD PRESSURE: 96 MMHG | WEIGHT: 116 LBS | HEART RATE: 136 BPM

## 2019-12-16 DIAGNOSIS — L97.521: ICD-10-CM

## 2019-12-16 PROBLEM — L97.522 ULCERATED, FOOT, LEFT, WITH FAT LAYER EXPOSED (HCC): Status: RESOLVED | Noted: 2019-08-12 | Resolved: 2019-12-16

## 2019-12-16 PROBLEM — E11.621 DIABETIC ULCER OF LEFT FOOT ASSOCIATED WITH TYPE 2 DIABETES MELLITUS, WITH NECROSIS OF MUSCLE (HCC): Status: RESOLVED | Noted: 2019-10-07 | Resolved: 2019-12-16

## 2019-12-16 PROBLEM — L97.523 DIABETIC ULCER OF LEFT FOOT ASSOCIATED WITH TYPE 2 DIABETES MELLITUS, WITH NECROSIS OF MUSCLE (HCC): Status: RESOLVED | Noted: 2019-10-07 | Resolved: 2019-12-16

## 2019-12-16 PROCEDURE — 97597 DBRDMT OPN WND 1ST 20 CM/<: CPT

## 2019-12-16 PROCEDURE — 97597 DBRDMT OPN WND 1ST 20 CM/<: CPT | Performed by: SURGERY

## 2019-12-16 RX ORDER — LIDOCAINE HYDROCHLORIDE 40 MG/ML
SOLUTION TOPICAL ONCE
Status: DISCONTINUED | OUTPATIENT
Start: 2019-12-16 | End: 2019-12-17 | Stop reason: HOSPADM

## 2019-12-30 ENCOUNTER — HOSPITAL ENCOUNTER (OUTPATIENT)
Dept: WOUND CARE | Age: 84
Discharge: HOME | End: 2019-12-30
Payer: MEDICARE

## 2019-12-30 VITALS
TEMPERATURE: 97.2 F | RESPIRATION RATE: 16 BRPM | WEIGHT: 116 LBS | BODY MASS INDEX: 19.33 KG/M2 | HEIGHT: 65 IN | DIASTOLIC BLOOD PRESSURE: 64 MMHG | SYSTOLIC BLOOD PRESSURE: 98 MMHG | HEART RATE: 144 BPM

## 2019-12-30 PROCEDURE — 99213 OFFICE O/P EST LOW 20 MIN: CPT

## 2019-12-30 PROCEDURE — 99212 OFFICE O/P EST SF 10 MIN: CPT | Performed by: SURGERY

## 2019-12-30 RX ORDER — LIDOCAINE HYDROCHLORIDE 40 MG/ML
SOLUTION TOPICAL ONCE
Status: DISCONTINUED | OUTPATIENT
Start: 2019-12-30 | End: 2019-12-31 | Stop reason: HOSPADM

## 2020-01-09 ENCOUNTER — APPOINTMENT (OUTPATIENT)
Dept: GENERAL RADIOLOGY | Age: 85
DRG: 308 | End: 2020-01-09
Payer: MEDICARE

## 2020-01-09 ENCOUNTER — HOSPITAL ENCOUNTER (INPATIENT)
Age: 85
LOS: 3 days | Discharge: HOME HEALTH | DRG: 308 | End: 2020-01-12
Attending: EMERGENCY MEDICINE | Admitting: INTERNAL MEDICINE
Payer: MEDICARE

## 2020-01-09 PROBLEM — I48.91 ATRIAL FIBRILLATION WITH RVR (HCC): Status: ACTIVE | Noted: 2020-01-09

## 2020-01-09 LAB
ALBUMIN SERPL-MCNC: 3.2 G/DL (ref 3.5–5.2)
ALP BLD-CCNC: 54 U/L (ref 35–104)
ALT SERPL-CCNC: 10 U/L (ref 0–32)
ANION GAP SERPL CALCULATED.3IONS-SCNC: 8 MMOL/L (ref 7–16)
AST SERPL-CCNC: 18 U/L (ref 0–31)
BASOPHILS ABSOLUTE: 0.06 E9/L (ref 0–0.2)
BASOPHILS RELATIVE PERCENT: 0.6 % (ref 0–2)
BILIRUB SERPL-MCNC: 0.9 MG/DL (ref 0–1.2)
BUN BLDV-MCNC: 16 MG/DL (ref 8–23)
CALCIUM SERPL-MCNC: 8.6 MG/DL (ref 8.6–10.2)
CHLORIDE BLD-SCNC: 101 MMOL/L (ref 98–107)
CO2: 27 MMOL/L (ref 22–29)
CREAT SERPL-MCNC: 0.8 MG/DL (ref 0.5–1)
EOSINOPHILS ABSOLUTE: 0.07 E9/L (ref 0.05–0.5)
EOSINOPHILS RELATIVE PERCENT: 0.7 % (ref 0–6)
GFR AFRICAN AMERICAN: >60
GFR NON-AFRICAN AMERICAN: >60 ML/MIN/1.73
GLUCOSE BLD-MCNC: 229 MG/DL (ref 74–99)
HCT VFR BLD CALC: 38.5 % (ref 34–48)
HEMOGLOBIN: 11.4 G/DL (ref 11.5–15.5)
IMMATURE GRANULOCYTES #: 0.03 E9/L
IMMATURE GRANULOCYTES %: 0.3 % (ref 0–5)
LYMPHOCYTES ABSOLUTE: 4.91 E9/L (ref 1.5–4)
LYMPHOCYTES RELATIVE PERCENT: 46.4 % (ref 20–42)
MAGNESIUM: 1.4 MG/DL (ref 1.6–2.6)
MCH RBC QN AUTO: 28.8 PG (ref 26–35)
MCHC RBC AUTO-ENTMCNC: 29.6 % (ref 32–34.5)
MCV RBC AUTO: 97.2 FL (ref 80–99.9)
METER GLUCOSE: 203 MG/DL (ref 74–99)
MONOCYTES ABSOLUTE: 0.64 E9/L (ref 0.1–0.95)
MONOCYTES RELATIVE PERCENT: 6 % (ref 2–12)
NEUTROPHILS ABSOLUTE: 4.88 E9/L (ref 1.8–7.3)
NEUTROPHILS RELATIVE PERCENT: 46 % (ref 43–80)
PDW BLD-RTO: 15.5 FL (ref 11.5–15)
PLATELET # BLD: 220 E9/L (ref 130–450)
PMV BLD AUTO: 10.1 FL (ref 7–12)
POTASSIUM SERPL-SCNC: 4.2 MMOL/L (ref 3.5–5)
PRO-BNP: ABNORMAL PG/ML (ref 0–450)
RBC # BLD: 3.96 E12/L (ref 3.5–5.5)
SODIUM BLD-SCNC: 136 MMOL/L (ref 132–146)
TOTAL PROTEIN: 6.4 G/DL (ref 6.4–8.3)
TROPONIN: <0.01 NG/ML (ref 0–0.03)
WBC # BLD: 10.6 E9/L (ref 4.5–11.5)

## 2020-01-09 PROCEDURE — 93005 ELECTROCARDIOGRAM TRACING: CPT | Performed by: EMERGENCY MEDICINE

## 2020-01-09 PROCEDURE — 85025 COMPLETE CBC W/AUTO DIFF WBC: CPT

## 2020-01-09 PROCEDURE — 99285 EMERGENCY DEPT VISIT HI MDM: CPT

## 2020-01-09 PROCEDURE — 80053 COMPREHEN METABOLIC PANEL: CPT

## 2020-01-09 PROCEDURE — 96375 TX/PRO/DX INJ NEW DRUG ADDON: CPT

## 2020-01-09 PROCEDURE — 36415 COLL VENOUS BLD VENIPUNCTURE: CPT

## 2020-01-09 PROCEDURE — 84484 ASSAY OF TROPONIN QUANT: CPT

## 2020-01-09 PROCEDURE — 94761 N-INVAS EAR/PLS OXIMETRY MLT: CPT

## 2020-01-09 PROCEDURE — G0378 HOSPITAL OBSERVATION PER HR: HCPCS

## 2020-01-09 PROCEDURE — 82962 GLUCOSE BLOOD TEST: CPT

## 2020-01-09 PROCEDURE — 2140000000 HC CCU INTERMEDIATE R&B

## 2020-01-09 PROCEDURE — 71045 X-RAY EXAM CHEST 1 VIEW: CPT

## 2020-01-09 PROCEDURE — 2500000003 HC RX 250 WO HCPCS: Performed by: EMERGENCY MEDICINE

## 2020-01-09 PROCEDURE — 96374 THER/PROPH/DIAG INJ IV PUSH: CPT

## 2020-01-09 PROCEDURE — 6360000002 HC RX W HCPCS: Performed by: EMERGENCY MEDICINE

## 2020-01-09 PROCEDURE — 6370000000 HC RX 637 (ALT 250 FOR IP): Performed by: INTERNAL MEDICINE

## 2020-01-09 PROCEDURE — 83735 ASSAY OF MAGNESIUM: CPT

## 2020-01-09 PROCEDURE — 83880 ASSAY OF NATRIURETIC PEPTIDE: CPT

## 2020-01-09 RX ORDER — FUROSEMIDE 10 MG/ML
40 INJECTION INTRAMUSCULAR; INTRAVENOUS ONCE
Status: COMPLETED | OUTPATIENT
Start: 2020-01-09 | End: 2020-01-09

## 2020-01-09 RX ORDER — METOPROLOL SUCCINATE 50 MG/1
100 TABLET, EXTENDED RELEASE ORAL 2 TIMES DAILY
Status: DISCONTINUED | OUTPATIENT
Start: 2020-01-09 | End: 2020-01-12 | Stop reason: HOSPADM

## 2020-01-09 RX ORDER — METOPROLOL TARTRATE 5 MG/5ML
5 INJECTION INTRAVENOUS ONCE
Status: COMPLETED | OUTPATIENT
Start: 2020-01-09 | End: 2020-01-09

## 2020-01-09 RX ORDER — SIMVASTATIN 10 MG
10 TABLET ORAL DAILY
Status: DISCONTINUED | OUTPATIENT
Start: 2020-01-10 | End: 2020-01-11

## 2020-01-09 RX ORDER — VITAMIN B COMPLEX
2000 TABLET ORAL DAILY
Status: DISCONTINUED | OUTPATIENT
Start: 2020-01-10 | End: 2020-01-12 | Stop reason: HOSPADM

## 2020-01-09 RX ADMIN — APIXABAN 2.5 MG: 2.5 TABLET, FILM COATED ORAL at 21:49

## 2020-01-09 RX ADMIN — METOPROLOL SUCCINATE 100 MG: 50 TABLET, EXTENDED RELEASE ORAL at 21:50

## 2020-01-09 RX ADMIN — FUROSEMIDE 40 MG: 10 INJECTION, SOLUTION INTRAMUSCULAR; INTRAVENOUS at 19:41

## 2020-01-09 RX ADMIN — METOPROLOL TARTRATE 5 MG: 5 INJECTION INTRAVENOUS at 19:07

## 2020-01-09 ASSESSMENT — PAIN SCALES - GENERAL: PAINLEVEL_OUTOF10: 0

## 2020-01-09 NOTE — LETTER
· All of the inpatient and outpatient Clinical Episodes that are currently included under [facility]. A Clinical Episode is a grouping of medical conditions or diagnoses that are included in the [address] [facility].

## 2020-01-10 LAB
ALBUMIN SERPL-MCNC: 3.1 G/DL (ref 3.5–5.2)
ALP BLD-CCNC: 54 U/L (ref 35–104)
ALT SERPL-CCNC: 9 U/L (ref 0–32)
ANION GAP SERPL CALCULATED.3IONS-SCNC: 18 MMOL/L (ref 7–16)
AST SERPL-CCNC: 18 U/L (ref 0–31)
BILIRUB SERPL-MCNC: 1 MG/DL (ref 0–1.2)
BUN BLDV-MCNC: 14 MG/DL (ref 8–23)
CALCIUM SERPL-MCNC: 8.7 MG/DL (ref 8.6–10.2)
CHLORIDE BLD-SCNC: 98 MMOL/L (ref 98–107)
CHOLESTEROL, TOTAL: 119 MG/DL (ref 0–199)
CO2: 22 MMOL/L (ref 22–29)
CREAT SERPL-MCNC: 0.7 MG/DL (ref 0.5–1)
EKG ATRIAL RATE: 101 BPM
EKG Q-T INTERVAL: 330 MS
EKG QRS DURATION: 86 MS
EKG QTC CALCULATION (BAZETT): 472 MS
EKG R AXIS: -35 DEGREES
EKG T AXIS: -160 DEGREES
EKG VENTRICULAR RATE: 123 BPM
GFR AFRICAN AMERICAN: >60
GFR NON-AFRICAN AMERICAN: >60 ML/MIN/1.73
GLUCOSE BLD-MCNC: 150 MG/DL (ref 74–99)
HBA1C MFR BLD: 7.6 % (ref 4–5.6)
HCT VFR BLD CALC: 42.3 % (ref 34–48)
HDLC SERPL-MCNC: 62 MG/DL
HEMOGLOBIN: 12.6 G/DL (ref 11.5–15.5)
LDL CHOLESTEROL CALCULATED: 42 MG/DL (ref 0–99)
MCH RBC QN AUTO: 29.2 PG (ref 26–35)
MCHC RBC AUTO-ENTMCNC: 29.8 % (ref 32–34.5)
MCV RBC AUTO: 97.9 FL (ref 80–99.9)
METER GLUCOSE: 171 MG/DL (ref 74–99)
PDW BLD-RTO: 15.5 FL (ref 11.5–15)
PLATELET # BLD: 220 E9/L (ref 130–450)
PMV BLD AUTO: 10.4 FL (ref 7–12)
POTASSIUM SERPL-SCNC: 3.7 MMOL/L (ref 3.5–5)
RBC # BLD: 4.32 E12/L (ref 3.5–5.5)
SODIUM BLD-SCNC: 138 MMOL/L (ref 132–146)
T4 TOTAL: 6.8 MCG/DL (ref 4.5–11.7)
TOTAL PROTEIN: 6.1 G/DL (ref 6.4–8.3)
TRIGL SERPL-MCNC: 75 MG/DL (ref 0–149)
TSH SERPL DL<=0.05 MIU/L-ACNC: 4.17 UIU/ML (ref 0.27–4.2)
VLDLC SERPL CALC-MCNC: 15 MG/DL
WBC # BLD: 12.5 E9/L (ref 4.5–11.5)

## 2020-01-10 PROCEDURE — 84443 ASSAY THYROID STIM HORMONE: CPT

## 2020-01-10 PROCEDURE — 80061 LIPID PANEL: CPT

## 2020-01-10 PROCEDURE — 6370000000 HC RX 637 (ALT 250 FOR IP): Performed by: INTERNAL MEDICINE

## 2020-01-10 PROCEDURE — 83036 HEMOGLOBIN GLYCOSYLATED A1C: CPT

## 2020-01-10 PROCEDURE — 36415 COLL VENOUS BLD VENIPUNCTURE: CPT

## 2020-01-10 PROCEDURE — 6370000000 HC RX 637 (ALT 250 FOR IP): Performed by: CLINICAL NURSE SPECIALIST

## 2020-01-10 PROCEDURE — 85027 COMPLETE CBC AUTOMATED: CPT

## 2020-01-10 PROCEDURE — 82962 GLUCOSE BLOOD TEST: CPT

## 2020-01-10 PROCEDURE — 93010 ELECTROCARDIOGRAM REPORT: CPT | Performed by: INTERNAL MEDICINE

## 2020-01-10 PROCEDURE — 2580000003 HC RX 258

## 2020-01-10 PROCEDURE — 2140000000 HC CCU INTERMEDIATE R&B

## 2020-01-10 PROCEDURE — 6360000002 HC RX W HCPCS: Performed by: CLINICAL NURSE SPECIALIST

## 2020-01-10 PROCEDURE — 84436 ASSAY OF TOTAL THYROXINE: CPT

## 2020-01-10 PROCEDURE — 99223 1ST HOSP IP/OBS HIGH 75: CPT | Performed by: INTERNAL MEDICINE

## 2020-01-10 PROCEDURE — 80053 COMPREHEN METABOLIC PANEL: CPT

## 2020-01-10 RX ORDER — NICOTINE POLACRILEX 4 MG
15 LOZENGE BUCCAL PRN
Status: DISCONTINUED | OUTPATIENT
Start: 2020-01-10 | End: 2020-01-12 | Stop reason: HOSPADM

## 2020-01-10 RX ORDER — FUROSEMIDE 10 MG/ML
40 INJECTION INTRAMUSCULAR; INTRAVENOUS ONCE
Status: COMPLETED | OUTPATIENT
Start: 2020-01-10 | End: 2020-01-10

## 2020-01-10 RX ORDER — MAGNESIUM SULFATE IN WATER 40 MG/ML
2 INJECTION, SOLUTION INTRAVENOUS ONCE
Status: COMPLETED | OUTPATIENT
Start: 2020-01-10 | End: 2020-01-10

## 2020-01-10 RX ORDER — AMIODARONE HYDROCHLORIDE 200 MG/1
400 TABLET ORAL 2 TIMES DAILY
Status: DISCONTINUED | OUTPATIENT
Start: 2020-01-10 | End: 2020-01-11

## 2020-01-10 RX ORDER — POTASSIUM CHLORIDE 20 MEQ/1
40 TABLET, EXTENDED RELEASE ORAL ONCE
Status: COMPLETED | OUTPATIENT
Start: 2020-01-10 | End: 2020-01-10

## 2020-01-10 RX ORDER — DEXTROSE MONOHYDRATE 25 G/50ML
12.5 INJECTION, SOLUTION INTRAVENOUS PRN
Status: DISCONTINUED | OUTPATIENT
Start: 2020-01-10 | End: 2020-01-12 | Stop reason: HOSPADM

## 2020-01-10 RX ORDER — DEXTROSE MONOHYDRATE 50 MG/ML
100 INJECTION, SOLUTION INTRAVENOUS PRN
Status: DISCONTINUED | OUTPATIENT
Start: 2020-01-10 | End: 2020-01-12 | Stop reason: HOSPADM

## 2020-01-10 RX ORDER — SODIUM CHLORIDE 0.9 % (FLUSH) 0.9 %
SYRINGE (ML) INJECTION
Status: COMPLETED
Start: 2020-01-10 | End: 2020-01-10

## 2020-01-10 RX ADMIN — METOPROLOL SUCCINATE 100 MG: 50 TABLET, EXTENDED RELEASE ORAL at 09:54

## 2020-01-10 RX ADMIN — APIXABAN 2.5 MG: 2.5 TABLET, FILM COATED ORAL at 20:23

## 2020-01-10 RX ADMIN — FUROSEMIDE 40 MG: 10 INJECTION, SOLUTION INTRAMUSCULAR; INTRAVENOUS at 12:34

## 2020-01-10 RX ADMIN — AMIODARONE HYDROCHLORIDE 400 MG: 200 TABLET ORAL at 23:44

## 2020-01-10 RX ADMIN — MAGNESIUM SULFATE 2 G: 2 INJECTION INTRAVENOUS at 09:46

## 2020-01-10 RX ADMIN — Medication: at 18:17

## 2020-01-10 RX ADMIN — AMIODARONE HYDROCHLORIDE 400 MG: 200 TABLET ORAL at 12:34

## 2020-01-10 RX ADMIN — INSULIN LISPRO 1 UNITS: 100 INJECTION, SOLUTION INTRAVENOUS; SUBCUTANEOUS at 09:00

## 2020-01-10 RX ADMIN — SODIUM CHLORIDE, PRESERVATIVE FREE 10 ML: 5 INJECTION INTRAVENOUS at 12:34

## 2020-01-10 RX ADMIN — APIXABAN 2.5 MG: 2.5 TABLET, FILM COATED ORAL at 09:54

## 2020-01-10 RX ADMIN — POTASSIUM CHLORIDE 40 MEQ: 1500 TABLET, EXTENDED RELEASE ORAL at 12:35

## 2020-01-10 RX ADMIN — METOPROLOL SUCCINATE 100 MG: 50 TABLET, EXTENDED RELEASE ORAL at 20:22

## 2020-01-10 RX ADMIN — VITAMIN D, TAB 1000IU (100/BT) 2000 UNITS: 25 TAB at 09:53

## 2020-01-10 RX ADMIN — SIMVASTATIN 10 MG: 10 TABLET, FILM COATED ORAL at 09:54

## 2020-01-10 RX ADMIN — INSULIN LISPRO 1 UNITS: 100 INJECTION, SOLUTION INTRAVENOUS; SUBCUTANEOUS at 20:57

## 2020-01-10 ASSESSMENT — PAIN SCALES - GENERAL
PAINLEVEL_OUTOF10: 0

## 2020-01-10 NOTE — ED PROVIDER NOTES
HPI:  1/9/20,   Time: 7:29 PM       Sarabjit Damon is a 80 y.o. female presenting to the ED for chest pain. Patient having chest pain shortness of breath for the past day. Nothing makes it better or worse, does not rate anywhere. Does have history of A. fib. There was concern she was in RVR. Highest heart rate was 187, she received no intervention in route. She is on Eliquis. Denies fever, chills, neck pain or stiffness, change in bowel or bladder, or any other symptoms or complaints    Review of Systems:   Pertinent positives and negatives are stated within HPI, all other systems reviewed and are negative.          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 ([address] Bronchitis, Carotid stenosis, Diabetes ([address] Diabetic ulcer of left foot associated with type 2 diabetes mellitus, with necrosis of muscle ([address] Hyperlipidemia, Hypertension, Ischemic ulcer of foot, limited to breakdown of skin, left ([address] Stenosis of both external carotid arteries, Type 2 diabetes mellitus with left diabetic foot ulcer ([address] Ulcerated, foot, left, limited to breakdown of skin ([address] Ulcerated, foot, left, limited to breakdown of skin ([address] Ulcerated, foot, left, with fat layer exposed ([address] and Vitamin D deficiency. Past Surgical History:  has a past surgical history that includes eye surgery (Bilateral); Appendectomy; Tonsillectomy; and [place] tooth extraction. Social History:  reports that she has never smoked. She has never used smokeless tobacco. She reports that she does not drink alcohol or use drugs. Family History: family history is not on file. The patients home medications have been reviewed.     Allergies: Pcn [penicillins]        ---------------------------------------------------PHYSICAL Monocytes % 6.0 2.0 - 12.0 %    Eosinophils % 0.7 0.0 - 6.0 %    Basophils % 0.6 0.0 - 2.0 %    Neutrophils Absolute 4.88 1.80 - 7.30 E9/L    Immature Granulocytes # 0.03 E9/L    Lymphocytes Absolute 4.91 (H) 1.50 - 4.00 E9/L    Monocytes Absolute 0.64 0.10 - 0.95 E9/L    Eosinophils Absolute 0.07 0.05 - 0.50 E9/L    Basophils Absolute 0.06 0.00 - 0.20 E9/L   Comprehensive Metabolic Panel   Result Value Ref Range    Sodium 136 132 - 146 mmol/L    Potassium 4.2 3.5 - 5.0 mmol/L    Chloride 101 98 - 107 mmol/L    CO2 27 22 - 29 mmol/L    Anion Gap 8 7 - 16 mmol/L    Glucose 229 (H) 74 - 99 mg/dL    BUN 16 8 - 23 mg/dL    CREATININE 0.8 0.5 - 1.0 mg/dL    GFR Non-African American >60 >=60 mL/min/1.73    GFR African American >60     Calcium 8.6 8.6 - 10.2 mg/dL    Total Protein 6.4 6.4 - 8.3 g/dL    Alb 3.2 (L) 3.5 - 5.2 g/dL    Total Bilirubin 0.9 0.0 - 1.2 mg/dL    Alkaline Phosphatase 54 35 - 104 U/L    ALT 10 0 - 32 U/L    AST 18 0 - 31 U/L   Troponin   Result Value Ref Range    Troponin <0.01 0.00 - 0.03 ng/mL   Brain Natriuretic Peptide   Result Value Ref Range    Pro-BNP 10,823 (H) 0 - 450 pg/mL   Magnesium   Result Value Ref Range    Magnesium 1.4 (L) 1.6 - 2.6 mg/dL       RADIOLOGY:  Interpreted by Radiologist.  XR CHEST PORTABLE    (Results Pending)       EKG: This EKG is signed and interpreted by the EP. A fib, rate 124, no STEMI      ------------------------- NURSING NOTES AND VITALS REVIEWED ---------------------------   The nursing notes within the ED encounter and vital signs as below have been reviewed by myself. BP (!) 135/97   Pulse 121   Temp 97.6 °F (36.4 °C)   Resp 16   SpO2 100%   Oxygen Saturation Interpretation: Normal    The patients available past medical records and past encounters were reviewed.         ------------------------------ ED COURSE/MEDICAL DECISION MAKING----------------------  Medications   furosemide (LASIX) injection 40 mg (has no administration in time range) metoprolol (LOPRESSOR) injection 5 mg (5 mg Intravenous Given 1/9/20 1907)         ED COURSE:       Medical Decision Making:    Patient was in A. fib with RVR. Did receive Lopressor and responded. Labs and imaging reviewed. Patient volume overloaded. Did receive Lasix. On reevaluation, she remains hemodynamically stable. Discussed with Dr. Melody Robert, patient will be admitted. Critical care time, 30 minutes      Counseling: The emergency provider has spoken with the patient and discussed todays results, in addition to providing specific details for the plan of care and counseling regarding the diagnosis and prognosis. Questions are answered at this time and they are agreeable with the plan.       --------------------------------- IMPRESSION AND DISPOSITION ---------------------------------    IMPRESSION  1. Atrial fibrillation with RVR ([facility]    2. Acute congestive heart failure, unspecified heart failure type ([facility]        DISPOSITION  Disposition: Admit to telemetry  Patient condition is stable    NOTE: This report was transcribed using voice recognition software.  Every effort was made to ensure accuracy; however, inadvertent computerized transcription errors may be present       Chester Wood MD  01/09/20 7598

## 2020-01-10 NOTE — CARE COORDINATION
1/10 Met with Mehrdad Florian. PTA she is from home. She Lives alone. She uses a walker at home. Her daughter in law checks on her daily and so does her son. With Last admission in October she went home with [facility]. She stated if need she would like to have again after this discharge home. RN asked to get order. After call referral TO EXT [id number].  Nikita/KE walker follow Ilia Clements CM

## 2020-01-10 NOTE — CONSULTS
Medications:     glucose (GLUTOSE) 40 % oral gel 15 g, 15 g, Oral, PRN, Rakesh Jenkins MD    dextrose 50 % IV solution, 12.5 g, Intravenous, PRN, Rakesh Jenkins MD    glucagon (rDNA) injection 1 mg, 1 mg, Intramuscular, PRN, Rakesh Jenkins MD    dextrose 5 % solution, 100 mL/hr, Intravenous, PRN, Rakesh Jenkins MD    amiodarone (CORDARONE) tablet 400 mg, 400 mg, Oral, BID, Meryl Winn MD, 400 mg at 01/10/20 2344    apixaban (ELIQUIS) tablet 2.5 mg, 2.5 mg, Oral, BID, Rakesh Jenkins MD, 2.5 mg at 01/10/20 2023    Vitamin D (CHOLECALCIFEROL) tablet 2,000 Units, 2,000 Units, Oral, Daily, Rakesh Jenkins MD, 2,000 Units at 01/10/20 3781    metoprolol succinate (TOPROL XL) extended release tablet 100 mg, 100 mg, Oral, BID, Rakesh Jenkins MD, 100 mg at 01/10/20 2022    simvastatin (ZOCOR) tablet 10 mg, 10 mg, Oral, Daily, Rakesh Jenkins MD, 10 mg at 01/10/20 0954    insulin lispro (HUMALOG) injection vial 0-6 Units, 0-6 Units, Subcutaneous, TID WC, Rakesh Jenkins MD, 1 Units at 01/10/20 0900    insulin lispro (HUMALOG) injection vial 0-3 Units, 0-3 Units, Subcutaneous, Nightly, Rakesh Jenkins MD, 1 Units at 01/10/20 2057    Allergies as of 01/09/2020 - Review Complete 01/09/2020   Allergen Reaction Noted    Pcn [penicillins] Hives and Itching 02/28/2018       Social History     Socioeconomic History    Marital status:       Spouse name: Not on file    Number of children: Not on file    Years of education: Not on file    Highest education level: Not on file   Occupational History    Not on file   Social Needs    Financial resource strain: Not on file    Food insecurity:     Worry: Not on file     Inability: Not on file    Transportation needs:     Medical: Not on file     Non-medical: Not on file   Tobacco Use    Smoking status: Never Smoker    Smokeless tobacco: Never Used   Substance and Sexual Activity    Alcohol use: No    Drug use: Never Type 2 diabetes mellitus    RECOMMENDATIONS:   1. Lasix 40 mg IV once, we'll follow diuretic response  2. Amiodarone 200 mg twice daily  3. We'll continue the rest of her medications  4. Basic metabolic panel in a.m.  5.  Intake and output  6. Further cardiac recommendations will be forthcoming pending her clinical course and diagnostic tests findings    I have reviewed my findings and recommendations with patient, no family at bedside    Thank you for the consult    Electronically signed by Adelaide Hein MD on 1/11/2020 at 7:37 AM  NOTE: This report was transcribed using voice recognition software.  Every effort was made to ensure accuracy; however, inadvertent computerized transcription errors may be present

## 2020-01-10 NOTE — ED NOTES
Called and gave Alex Pacheco RN a report on pt heart rate and known diagnosis of A-fib prior to transfer to unit.      Karsten Gordillo RN  01/09/20 2023

## 2020-01-11 LAB
ALBUMIN SERPL-MCNC: 2.8 G/DL (ref 3.5–5.2)
ALP BLD-CCNC: 45 U/L (ref 35–104)
ALT SERPL-CCNC: 8 U/L (ref 0–32)
ANION GAP SERPL CALCULATED.3IONS-SCNC: 12 MMOL/L (ref 7–16)
AST SERPL-CCNC: 16 U/L (ref 0–31)
BILIRUB SERPL-MCNC: 1 MG/DL (ref 0–1.2)
BUN BLDV-MCNC: 17 MG/DL (ref 8–23)
CALCIUM SERPL-MCNC: 8.5 MG/DL (ref 8.6–10.2)
CHLORIDE BLD-SCNC: 98 MMOL/L (ref 98–107)
CO2: 29 MMOL/L (ref 22–29)
CREAT SERPL-MCNC: 0.9 MG/DL (ref 0.5–1)
GFR AFRICAN AMERICAN: >60
GFR NON-AFRICAN AMERICAN: 59 ML/MIN/1.73
GLUCOSE BLD-MCNC: 129 MG/DL (ref 74–99)
HCT VFR BLD CALC: 37.6 % (ref 34–48)
HEMOGLOBIN: 11.5 G/DL (ref 11.5–15.5)
MCH RBC QN AUTO: 29.5 PG (ref 26–35)
MCHC RBC AUTO-ENTMCNC: 30.6 % (ref 32–34.5)
MCV RBC AUTO: 96.4 FL (ref 80–99.9)
METER GLUCOSE: 128 MG/DL (ref 74–99)
PDW BLD-RTO: 15.7 FL (ref 11.5–15)
PLATELET # BLD: 211 E9/L (ref 130–450)
PMV BLD AUTO: 10.6 FL (ref 7–12)
POTASSIUM SERPL-SCNC: 4.2 MMOL/L (ref 3.5–5)
RBC # BLD: 3.9 E12/L (ref 3.5–5.5)
SODIUM BLD-SCNC: 139 MMOL/L (ref 132–146)
TOTAL PROTEIN: 5.6 G/DL (ref 6.4–8.3)
WBC # BLD: 9.9 E9/L (ref 4.5–11.5)

## 2020-01-11 PROCEDURE — 99233 SBSQ HOSP IP/OBS HIGH 50: CPT | Performed by: INTERNAL MEDICINE

## 2020-01-11 PROCEDURE — 6370000000 HC RX 637 (ALT 250 FOR IP): Performed by: INTERNAL MEDICINE

## 2020-01-11 PROCEDURE — 2580000003 HC RX 258

## 2020-01-11 PROCEDURE — 36415 COLL VENOUS BLD VENIPUNCTURE: CPT

## 2020-01-11 PROCEDURE — 85027 COMPLETE CBC AUTOMATED: CPT

## 2020-01-11 PROCEDURE — 82962 GLUCOSE BLOOD TEST: CPT

## 2020-01-11 PROCEDURE — 6360000002 HC RX W HCPCS: Performed by: INTERNAL MEDICINE

## 2020-01-11 PROCEDURE — 80053 COMPREHEN METABOLIC PANEL: CPT

## 2020-01-11 PROCEDURE — 2140000000 HC CCU INTERMEDIATE R&B

## 2020-01-11 RX ORDER — FUROSEMIDE 10 MG/ML
20 INJECTION INTRAMUSCULAR; INTRAVENOUS ONCE
Status: COMPLETED | OUTPATIENT
Start: 2020-01-11 | End: 2020-01-11

## 2020-01-11 RX ORDER — SODIUM CHLORIDE 0.9 % (FLUSH) 0.9 %
SYRINGE (ML) INJECTION
Status: COMPLETED
Start: 2020-01-11 | End: 2020-01-11

## 2020-01-11 RX ORDER — AMIODARONE HYDROCHLORIDE 200 MG/1
200 TABLET ORAL 2 TIMES DAILY
Status: DISCONTINUED | OUTPATIENT
Start: 2020-01-11 | End: 2020-01-12

## 2020-01-11 RX ORDER — FUROSEMIDE 20 MG/1
20 TABLET ORAL DAILY
Status: DISCONTINUED | OUTPATIENT
Start: 2020-01-12 | End: 2020-01-12 | Stop reason: HOSPADM

## 2020-01-11 RX ORDER — ATORVASTATIN CALCIUM 10 MG/1
10 TABLET, FILM COATED ORAL NIGHTLY
Status: DISCONTINUED | OUTPATIENT
Start: 2020-01-11 | End: 2020-01-12 | Stop reason: HOSPADM

## 2020-01-11 RX ADMIN — Medication: at 18:00

## 2020-01-11 RX ADMIN — ATORVASTATIN CALCIUM 10 MG: 10 TABLET, FILM COATED ORAL at 20:57

## 2020-01-11 RX ADMIN — METOPROLOL SUCCINATE 100 MG: 50 TABLET, EXTENDED RELEASE ORAL at 09:05

## 2020-01-11 RX ADMIN — APIXABAN 2.5 MG: 2.5 TABLET, FILM COATED ORAL at 20:57

## 2020-01-11 RX ADMIN — APIXABAN 2.5 MG: 2.5 TABLET, FILM COATED ORAL at 09:07

## 2020-01-11 RX ADMIN — AMIODARONE HYDROCHLORIDE 200 MG: 200 TABLET ORAL at 09:05

## 2020-01-11 RX ADMIN — METOPROLOL SUCCINATE 100 MG: 50 TABLET, EXTENDED RELEASE ORAL at 20:57

## 2020-01-11 RX ADMIN — DILTIAZEM HYDROCHLORIDE 30 MG: 30 TABLET, FILM COATED ORAL at 17:59

## 2020-01-11 RX ADMIN — VITAMIN D, TAB 1000IU (100/BT) 2000 UNITS: 25 TAB at 10:04

## 2020-01-11 RX ADMIN — AMIODARONE HYDROCHLORIDE 200 MG: 200 TABLET ORAL at 20:57

## 2020-01-11 RX ADMIN — SIMVASTATIN 10 MG: 10 TABLET, FILM COATED ORAL at 08:00

## 2020-01-11 RX ADMIN — FUROSEMIDE 20 MG: 10 INJECTION, SOLUTION INTRAMUSCULAR; INTRAVENOUS at 18:00

## 2020-01-11 ASSESSMENT — PAIN SCALES - GENERAL
PAINLEVEL_OUTOF10: 0

## 2020-01-11 NOTE — PROGRESS NOTES
Inpatient Cardiology Progress note     PATIENT IS BEING FOLLOWED FOR: Afib with RVR    [place] is a 80 y.o. female known to Dr. Fatimah Mccordiff: Denies CP or SOB currently. Does feel palpitations / rapid heart   OBJECTIVE: No apparent distress     ROS:  Consist: Denies fevers, chills or night sweats  Heart: Denies chest pain, palpitations, lightheadedness, dizziness or syncope  Lungs: Denies SOB, cough, wheezing, orthopnea or PND  GI: Denies abdominal pain, vomiting or diarrhea    PHYSICAL EXAM:   /78   Pulse 118   Temp 97.3 °F (36.3 °C) (Temporal)   Resp 16   Ht 5' 5\" (1.651 m)   Wt 115 lb 12.8 oz (52.5 kg)   SpO2 97%   BMI 19.27 kg/m²    B/P Range last 24 hours: Systolic (24ICI), DDK:501 , Min:105 , JXV:440    Diastolic (96LZY), LZP:53, Min:60, Max:78    CONST: Well developed, well nourished elderly lady who appears of stated age. Awake, alert and cooperative. No apparent distress  HEENT:   Head- Normocephalic, atraumatic   Eyes- Conjunctivae pink, anicteric  Throat- Oral mucosa pink and moist  Neck-  No stridor, trachea midline, no jugular venous distention. No carotid bruit  CHEST: Chest symmetrical and non-tender to palpation. No accessory muscle use or intercostal retractions  RESPIRATORY:  Lung sounds - clear throughout fields   CARDIOVASCULAR:     Heart Inspection- shows no noted pulsations  Heart Palpation- no heaves or thrills; PMI is non-displaced   Heart Ausculation- irregular rate and rhythm, no murmur. No s3 or rub   PV: 1-2+ left  lower extremity edema. No varicosities. Pedal pulses palpable, no clubbing or cyanosis   ABDOMEN: Soft, non-tender to light palpation. Bowel sounds present. No palpable masses no organomegaly; no abdominal bruit  MS: Good muscle strength and tone. No atrophy or abnormal movements. : Deferred  SKIN: Warm and dry no statis dermatitis or ulcers   NEURO / PSYCH: Oriented to person, place and time. Speech clear and appropriate.  Follows all

## 2020-01-11 NOTE — PROGRESS NOTES
Feels better with less dyspnea  Afebrile , vs stable  AF with RVR  Lungs somewhat more clear  Less peripheral edema  Continue present RX

## 2020-01-11 NOTE — H&P
Denies. PSYCHIATRIC:  Denies. PHYSICAL EXAMINATION:  GENERAL:  The patient was a pleasant 80-year-old  female, alert  and oriented in no acute distress. HEENT:  Head normal size and shape. Pupils equal and reactive. Sclerae  were clear. Extraocular movements were intact. Fundi were not  visualized. Tympanic membranes were not visualized. Oral mucous  membranes were clear and adequately hydrated. LUNGS:  Bibasilar rales were present with diminished breath sounds, both  lower lung fields. CARDIOVASCULAR:  Revealed atrial fibrillation with a rapid rate. ABDOMEN:  Soft and flat. No masses. No tenderness. GENITOURINARY:  Deferred. RECTAL:  Deferred. EXTREMITIES:  No edema, no clubbing, no cyanosis. Complete healing of  the diabetic foot ulcer, left foot. CLINICAL IMPRESSION:  1. Atrial fibrillation with RVR. 2.  Congestive heart failure, acute. 3.  Type 2 diabetes mellitus. 4.  Hypertension. 5.  Hyperlipidemia. 6.  Diabetic peripheral neuropathy.         Kendra Aguiar DO    D: 01/10/2020 16:11:43       T: 01/10/2020 16:22:28     [email or web address]  Job#: [id number]     Doc#: [id number]    CC:

## 2020-01-12 VITALS
TEMPERATURE: 97 F | BODY MASS INDEX: 19.29 KG/M2 | DIASTOLIC BLOOD PRESSURE: 60 MMHG | OXYGEN SATURATION: 98 % | HEART RATE: 78 BPM | RESPIRATION RATE: 16 BRPM | HEIGHT: 65 IN | SYSTOLIC BLOOD PRESSURE: 128 MMHG | WEIGHT: 115.8 LBS

## 2020-01-12 PROBLEM — J90 BILATERAL PLEURAL EFFUSION: Status: ACTIVE | Noted: 2020-01-12

## 2020-01-12 PROBLEM — I50.33 ACUTE ON CHRONIC HEART FAILURE WITH PRESERVED EJECTION FRACTION (HFPEF) (HCC): Status: ACTIVE | Noted: 2020-01-12

## 2020-01-12 PROBLEM — E11.9 DM (DIABETES MELLITUS), TYPE 2 (HCC): Chronic | Status: ACTIVE | Noted: 2020-01-12

## 2020-01-12 LAB
ALBUMIN SERPL-MCNC: 3.1 G/DL (ref 3.5–5.2)
ALP BLD-CCNC: 45 U/L (ref 35–104)
ALT SERPL-CCNC: 8 U/L (ref 0–32)
ANION GAP SERPL CALCULATED.3IONS-SCNC: 18 MMOL/L (ref 7–16)
AST SERPL-CCNC: 15 U/L (ref 0–31)
BILIRUB SERPL-MCNC: 1.4 MG/DL (ref 0–1.2)
BUN BLDV-MCNC: 20 MG/DL (ref 8–23)
CALCIUM SERPL-MCNC: 8.6 MG/DL (ref 8.6–10.2)
CHLORIDE BLD-SCNC: 96 MMOL/L (ref 98–107)
CO2: 26 MMOL/L (ref 22–29)
CREAT SERPL-MCNC: 0.9 MG/DL (ref 0.5–1)
GFR AFRICAN AMERICAN: >60
GFR NON-AFRICAN AMERICAN: 59 ML/MIN/1.73
GLUCOSE BLD-MCNC: 132 MG/DL (ref 74–99)
HCT VFR BLD CALC: 37 % (ref 34–48)
HEMOGLOBIN: 11.2 G/DL (ref 11.5–15.5)
MCH RBC QN AUTO: 29.2 PG (ref 26–35)
MCHC RBC AUTO-ENTMCNC: 30.3 % (ref 32–34.5)
MCV RBC AUTO: 96.6 FL (ref 80–99.9)
PDW BLD-RTO: 15.7 FL (ref 11.5–15)
PLATELET # BLD: 213 E9/L (ref 130–450)
PMV BLD AUTO: 9.8 FL (ref 7–12)
POTASSIUM SERPL-SCNC: 3.6 MMOL/L (ref 3.5–5)
RBC # BLD: 3.83 E12/L (ref 3.5–5.5)
SODIUM BLD-SCNC: 140 MMOL/L (ref 132–146)
TOTAL PROTEIN: 5.7 G/DL (ref 6.4–8.3)
WBC # BLD: 11.6 E9/L (ref 4.5–11.5)

## 2020-01-12 PROCEDURE — 6370000000 HC RX 637 (ALT 250 FOR IP): Performed by: INTERNAL MEDICINE

## 2020-01-12 PROCEDURE — 87077 CULTURE AEROBIC IDENTIFY: CPT

## 2020-01-12 PROCEDURE — 99233 SBSQ HOSP IP/OBS HIGH 50: CPT | Performed by: INTERNAL MEDICINE

## 2020-01-12 PROCEDURE — 87186 SC STD MICRODIL/AGAR DIL: CPT

## 2020-01-12 PROCEDURE — 85027 COMPLETE CBC AUTOMATED: CPT

## 2020-01-12 PROCEDURE — 36415 COLL VENOUS BLD VENIPUNCTURE: CPT

## 2020-01-12 PROCEDURE — 87088 URINE BACTERIA CULTURE: CPT

## 2020-01-12 PROCEDURE — 80053 COMPREHEN METABOLIC PANEL: CPT

## 2020-01-12 RX ORDER — METFORMIN HYDROCHLORIDE 500 MG/1
500 TABLET, EXTENDED RELEASE ORAL
Status: DISCONTINUED | OUTPATIENT
Start: 2020-01-12 | End: 2020-01-12 | Stop reason: HOSPADM

## 2020-01-12 RX ORDER — AMIODARONE HYDROCHLORIDE 200 MG/1
200 TABLET ORAL DAILY
Status: DISCONTINUED | OUTPATIENT
Start: 2020-01-12 | End: 2020-01-12 | Stop reason: HOSPADM

## 2020-01-12 RX ORDER — POTASSIUM CHLORIDE 20 MEQ/1
40 TABLET, EXTENDED RELEASE ORAL ONCE
Status: COMPLETED | OUTPATIENT
Start: 2020-01-12 | End: 2020-01-12

## 2020-01-12 RX ORDER — METFORMIN HYDROCHLORIDE 500 MG/1
500 TABLET, EXTENDED RELEASE ORAL
Qty: 30 TABLET | Refills: 3 | Status: ON HOLD | OUTPATIENT
Start: 2020-01-12 | End: 2022-01-13

## 2020-01-12 RX ORDER — FUROSEMIDE 20 MG/1
20 TABLET ORAL DAILY
Qty: 30 TABLET | Refills: 3 | Status: SHIPPED | OUTPATIENT
Start: 2020-01-13 | End: 2022-01-13

## 2020-01-12 RX ORDER — AMIODARONE HYDROCHLORIDE 200 MG/1
200 TABLET ORAL DAILY
Status: DISCONTINUED | OUTPATIENT
Start: 2020-01-13 | End: 2020-01-12

## 2020-01-12 RX ORDER — DILTIAZEM HYDROCHLORIDE 120 MG/1
120 CAPSULE, COATED, EXTENDED RELEASE ORAL DAILY
Qty: 30 CAPSULE | Refills: 3 | Status: SHIPPED | OUTPATIENT
Start: 2020-01-12 | End: 2022-01-13

## 2020-01-12 RX ORDER — DILTIAZEM HYDROCHLORIDE 120 MG/1
120 CAPSULE, COATED, EXTENDED RELEASE ORAL DAILY
Status: DISCONTINUED | OUTPATIENT
Start: 2020-01-12 | End: 2020-01-12 | Stop reason: HOSPADM

## 2020-01-12 RX ORDER — AMIODARONE HYDROCHLORIDE 200 MG/1
200 TABLET ORAL DAILY
Qty: 30 TABLET | Refills: 3 | Status: SHIPPED | OUTPATIENT
Start: 2020-01-13 | End: 2020-01-27 | Stop reason: SINTOL

## 2020-01-12 RX ADMIN — APIXABAN 2.5 MG: 2.5 TABLET, FILM COATED ORAL at 12:09

## 2020-01-12 RX ADMIN — AMIODARONE HYDROCHLORIDE 200 MG: 200 TABLET ORAL at 14:36

## 2020-01-12 RX ADMIN — DILTIAZEM HYDROCHLORIDE 120 MG: 120 CAPSULE, COATED, EXTENDED RELEASE ORAL at 14:35

## 2020-01-12 RX ADMIN — POTASSIUM CHLORIDE 40 MEQ: 1500 TABLET, EXTENDED RELEASE ORAL at 12:09

## 2020-01-12 RX ADMIN — DILTIAZEM HYDROCHLORIDE 30 MG: 30 TABLET, FILM COATED ORAL at 00:33

## 2020-01-12 RX ADMIN — DILTIAZEM HYDROCHLORIDE 30 MG: 30 TABLET, FILM COATED ORAL at 06:58

## 2020-01-12 RX ADMIN — VITAMIN D, TAB 1000IU (100/BT) 2000 UNITS: 25 TAB at 09:08

## 2020-01-12 RX ADMIN — METOPROLOL SUCCINATE 100 MG: 50 TABLET, EXTENDED RELEASE ORAL at 10:00

## 2020-01-12 RX ADMIN — FUROSEMIDE 20 MG: 20 TABLET ORAL at 10:00

## 2020-01-12 ASSESSMENT — PAIN SCALES - GENERAL: PAINLEVEL_OUTOF10: 0

## 2020-01-12 NOTE — PROGRESS NOTES
Feels well  Afebrile , vs stable  AF with controlled rate  Lungs more clear  Discharge to home today

## 2020-01-12 NOTE — PROGRESS NOTES
commands. Pleasant affect       Intake/Output Summary (Last 24 hours) at 1/12/2020 1114  Last data filed at 1/12/2020 0042  Gross per 24 hour   Intake 480 ml   Output 1300 ml   Net -820 ml       Weight:   Wt Readings from Last 3 Encounters:   01/11/20 115 lb 12.8 oz (52.5 kg)   12/30/19 116 lb (52.6 kg)   12/16/19 116 lb (52.6 kg)     Current Inpatient Medications:   amiodarone  200 mg Oral BID    furosemide  20 mg Oral Daily    diltiazem  30 mg Oral 4 times per day    atorvastatin  10 mg Oral Nightly    apixaban  2.5 mg Oral BID    Vitamin D  2,000 Units Oral Daily    metoprolol succinate  100 mg Oral BID    insulin lispro  0-6 Units Subcutaneous TID WC    insulin lispro  0-3 Units Subcutaneous Nightly       IV Infusions (if any):   dextrose         DIAGNOSTIC/ LABORATORY DATA:  Labs:   CBC:   Recent Labs     01/11/20  0537 01/12/20  0642   WBC 9.9 11.6*   HGB 11.5 11.2*   HCT 37.6 37.0    213     BMP:   Recent Labs     01/11/20  0537 01/12/20  0642    140   K 4.2 3.6   CO2 29 26   BUN 17 20   CREATININE 0.9 0.9   LABGLOM 59 59   CALCIUM 8.5* 8.6     Mag:   Recent Labs     01/09/20  1856   MG 1.4*     TSH:   Recent Labs     01/10/20  0404   TSH 4.170     HgA1c:   Lab Results   Component Value Date    LABA1C 7.6 (H) 01/10/2020     CARDIAC ENZYMES:  Recent Labs     01/09/20  1856   TROPONINI <0.01     FASTING LIPID PANEL:  Lab Results   Component Value Date    CHOL 119 01/10/2020    HDL 62 01/10/2020    LDLCALC 42 01/10/2020    TRIG 75 01/10/2020     LIVER PROFILE:  Recent Labs     01/11/20  0537 01/12/20  0642   AST 16 15   ALT 8 8   LABALBU 2.8* 3.1*     Pro BNP 1/9/2020 = 10,823    CXR 1/9/2020:   Bilateral pleural effusions. Telemetry: Afib rate controlled    Echo 10/17/19:  Cardiac rhythm: atrial fibrillation with RVR   Ejection fraction is visually estimated at 50-55%. Dilated left atrium by volume index. Mild mitral regurgitation. Moderate tricuspid regurgitation.    PASP is estimated at 45 mmHg. ASSESSMENT:   1. Afib, persistent / permanent,  Now rate controlled. Anticoagulated on Eliquis  2. Acute HFpEF ( markedly elevated pro BNP but denies SOB ) with bilateral pleural effusions  3. VHD ( mild MR and moderate TR ) with mild pulmonary HTN  4. DM  5. HTN  6. HLD  7. Carotid stenosis  8. PAD with h/o diabetic foot ulcer        PLAN:  1. DCCV ( Cardioversion ) discussed with patient, she is reluctant  2. Will decrease, but continue amiodarone, in case she changes her mind regarding DCCV  3. Change Cardizem to long acting formulation  4. Rest of cardiac medications same  5. May be discharged from Cardiology stand point when OK with the primary service  6. Cardiology will sign off.  Please call if needed    Electronically signed by Micki Barnhart MD on 1/12/2020 at 11:14 AM

## 2020-01-13 ENCOUNTER — CARE COORDINATION (OUTPATIENT)
Dept: CASE MANAGEMENT | Age: 85
End: 2020-01-13

## 2020-01-13 ENCOUNTER — HOSPITAL ENCOUNTER (OUTPATIENT)
Dept: WOUND CARE | Age: 85
Discharge: HOME | End: 2020-01-13
Payer: MEDICARE

## 2020-01-13 NOTE — CARE COORDINATION
[facility] Transitions Initial Follow Up Call    Call within 2 business days of discharge: Yes    Patient: Wilfrido Host Patient : 1927   MRN: [id number]  Reason for Admission: Atrial fibrillation with RVR  Discharge Date: 20 RARS: Readmission Risk Score: 14      Last Discharge [address] [address]       Complaint Diagnosis Description Type Department Provider    20 Chest Pain; Shortness of Breath Atrial fibrillation with RVR ([facility] . .. ED to Hosp-Admission (Discharged) (ADMITTED) [facility] Geeta Post MD; Jesenia BAIRD Spoke with: Asuncion, patient's daughter-in-law with patient's permission    [place] [facility]    Non-face-to-face services provided:  Scheduled appointment with PCP-Rigo Roland, patient has appt with Dr. Dirk Fajardo on 20. Salley Splinter, patient's son will contact Dr. Dirk Fajardo to inquire if follow up needs to be before 20. Scheduled appointment with Specialist-Rigo Roland appt with Dr. Nancy Hart at [facility] cancelled today due to pressure wound on left foot being healed. Verified with sylvester Rolandt with Dr. Nancy Hart in his office on 20. Obtained and reviewed discharge summary and/or continuity of care documents  Communication with home health agencies or other community services the patient is currently using-Per Asuncion, patient's daughter-in-law, home care start of care scheduled for today.     Care Transitions 24 Hour Call    Do you have any ongoing symptoms?:  Yes  Patient-reported symptoms:  Other (Comment: leg edema)  Do you have a copy of your discharge instructions?:  Yes  Do you have all of your prescriptions and are they filled?:  Yes  Have you been contacted by a [address]?:  No  Have you scheduled your follow up appointment?:  No  Were you discharged with any Home Care or Post Acute Services:  Yes  Post Acute Services:  Home Health (Comment: [facility] )  Do you feel like you have everything you need to keep you well at home?:  Yes  Care Transitions

## 2020-01-14 LAB
ORGANISM: ABNORMAL
URINE CULTURE, ROUTINE: ABNORMAL

## 2020-01-20 ENCOUNTER — HOSPITAL ENCOUNTER (OUTPATIENT)
Dept: WOUND CARE | Age: 85
Discharge: HOME | End: 2020-01-20
Payer: MEDICARE

## 2020-01-20 PROBLEM — I73.9 PVD (PERIPHERAL VASCULAR DISEASE) WITH CLAUDICATION (HCC): Status: ACTIVE | Noted: 2020-01-20

## 2020-01-21 ENCOUNTER — OFFICE VISIT (OUTPATIENT)
Dept: CARDIOLOGY CLINIC | Age: 85
End: 2020-01-21
Payer: MEDICARE

## 2020-01-21 VITALS
HEIGHT: 65 IN | HEART RATE: 66 BPM | BODY MASS INDEX: 18.16 KG/M2 | DIASTOLIC BLOOD PRESSURE: 70 MMHG | WEIGHT: 109 LBS | SYSTOLIC BLOOD PRESSURE: 112 MMHG | RESPIRATION RATE: 16 BRPM

## 2020-01-21 PROCEDURE — 99215 OFFICE O/P EST HI 40 MIN: CPT | Performed by: INTERNAL MEDICINE

## 2020-01-21 PROCEDURE — G8427 DOCREV CUR MEDS BY ELIG CLIN: HCPCS | Performed by: INTERNAL MEDICINE

## 2020-01-21 PROCEDURE — 1036F TOBACCO NON-USER: CPT | Performed by: INTERNAL MEDICINE

## 2020-01-21 PROCEDURE — G8484 FLU IMMUNIZE NO ADMIN: HCPCS | Performed by: INTERNAL MEDICINE

## 2020-01-21 PROCEDURE — 1123F ACP DISCUSS/DSCN MKR DOCD: CPT | Performed by: INTERNAL MEDICINE

## 2020-01-21 PROCEDURE — 4040F PNEUMOC VAC/ADMIN/RCVD: CPT | Performed by: INTERNAL MEDICINE

## 2020-01-21 PROCEDURE — G8419 CALC BMI OUT NRM PARAM NOF/U: HCPCS | Performed by: INTERNAL MEDICINE

## 2020-01-21 PROCEDURE — 93000 ELECTROCARDIOGRAM COMPLETE: CPT | Performed by: INTERNAL MEDICINE

## 2020-01-21 PROCEDURE — 1090F PRES/ABSN URINE INCON ASSESS: CPT | Performed by: INTERNAL MEDICINE

## 2020-01-21 PROCEDURE — 1111F DSCHRG MED/CURRENT MED MERGE: CPT | Performed by: INTERNAL MEDICINE

## 2020-01-21 NOTE — PROGRESS NOTES
Chief Complaint   Patient presents with    Atrial Fibrillation       Patient Active Problem List    Diagnosis Date Noted    PVD (peripheral vascular disease) with claudication ([facility] 01/20/2020    Acute on chronic heart failure with preserved ejection fraction (HFpEF) ([facility] 01/12/2020    Bilateral pleural effusion 01/12/2020    DM (diabetes mellitus), type 2 ([facility] 01/12/2020    Atrial fibrillation with RVR ([facility] 01/09/2020    Ischemic ulcer of foot, limited to breakdown of skin, left ([facility] 12/16/2019    New onset a-fib ([facility] 10/14/2019    New onset atrial fibrillation ([facility] 10/14/2019    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 ([facility] 08/12/2019    Bruit of left carotid artery 02/28/2018    Stenosis of both external carotid arteries 02/28/2018       Current Outpatient Medications   Medication Sig Dispense Refill    amiodarone (CORDARONE) 200 MG tablet Take 1 tablet by mouth daily for 30 doses 30 tablet 3    metFORMIN (GLUCOPHAGE-XR) 500 MG extended release tablet Take 1 tablet by mouth Daily with supper for 30 doses 30 tablet 3    diltiazem (CARDIZEM CD) 120 MG extended release capsule Take 1 capsule by mouth daily for 30 doses 30 capsule 3    furosemide (LASIX) 20 MG tablet Take 1 tablet by mouth daily for 30 doses 30 tablet 3    apixaban (ELIQUIS) 2.5 MG TABS tablet Take 2.5 mg by mouth 2 times daily      metoprolol succinate (TOPROL XL) 100 MG extended release tablet Take 100 mg by mouth 2 times daily      simvastatin (ZOCOR) 10 MG tablet Take 10 mg by mouth daily      Cholecalciferol (VITAMIN D3) 2000 units CAPS Take 2,000 Units by mouth daily        No current facility-administered medications for this visit.          Allergies   Allergen Reactions    Pcn [Penicillins] Hives and Itching       Vitals:    01/21/20 1112   BP: 112/70   Pulse: 66   Resp: 16   Weight: 109 lb (49.4 kg)   Height: 5' 5\" (1.651 m)       Social History     Socioeconomic History  Marital status:      Spouse name: Not on file    Number of children: Not on file    Years of education: Not on file    Highest education level: Not on file   Occupational History    Not on file   Social Needs    Financial resource strain: Not on file    Food insecurity:     Worry: Not on file     Inability: Not on file    Transportation needs:     Medical: Not on file     Non-medical: Not on file   Tobacco Use    Smoking status: Never Smoker    Smokeless tobacco: Never Used   Substance and Sexual Activity    Alcohol use: No    Drug use: Never    Sexual activity: Not on file   Lifestyle    Physical activity:     Days per week: Not on file     Minutes per session: Not on file    Stress: Not on file   Relationships    Social connections:     Talks on phone: Not on file     Gets together: Not on file     Attends Mandaeism service: Not on file     Active member of club or organization: Not on file     Attends meetings of clubs or organizations: Not on file     Relationship status: Not on file    Intimate partner violence:     Fear of current or ex partner: Not on file     Emotionally abused: Not on file     Physically abused: Not on file     Forced sexual activity: Not on file   Other Topics Concern    Not on file   Social History Narrative    Not on file       History reviewed. No pertinent family history. SUBJECTIVE: Chuckie Robles presents to the office today for re-evaluation of cardiac diagnoses and hfu.  I reviewed her primary cardiologist's note ( dr. Cee Haynes) and hospital admission. .  She complains of no unstable cardiac symptoms and feels better since hospital admission and denies   chest pain, chest pressure/discomfort, claudication, dyspnea, exertional chest pressure/discomfort, fatigue, irregular heart beat, lower extremity edema, near-syncope, orthopnea, palpitations, paroxysmal nocturnal dyspnea, syncope and tachypnea. In a wheelchair due to foot/ankle fx.     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 (HCC)    New onset a-fib ([address]    New onset atrial fibrillation (HCC)    Ischemic ulcer of foot, limited to breakdown of skin, left (HCC)    Atrial fibrillation with RVR ([address]    Acute on chronic heart failure with preserved ejection fraction (HFpEF) (HCC)    Bilateral pleural effusion    DM (diabetes mellitus), type 2 ([address]    PVD (peripheral vascular disease) with claudication ([address]     Patient with recurrent atrial fibrillation with complication of chf  cxr on admission 1/9 with marked edema, was placed by cardiology consultants on oral amio, presumably with an eye toward DCCV, as oral amio not known to effect chemical cardioversion. She did not wish to entertain procedure in hospital  Now improved, but remains in afib with rate controlled on low dose amio, and cardizem and toprol. Continues uninterrupted NOAC. Given her presentation with severe CHF I recommended attempt to restore sinus with DCCV.    Extensive discussion with patient and son about procedure, risks/benefits, etc.  She is still unable to make a decision and will d/w her PCP this week              Laura Calderon M.D  [facility]

## 2020-01-23 ENCOUNTER — CARE COORDINATION (OUTPATIENT)
Dept: CASE MANAGEMENT | Age: 85
End: 2020-01-23

## 2020-01-23 NOTE — CARE COORDINATION
[facility] Transitions Follow Up Call    2020    Patient: Chester Abel  Patient : 1927   MRN: <[id number]>  Reason for Admission:   Discharge Date: 20 RARS: Readmission Risk Score: 14         Spoke with: Patient    Called and spoke with patient. Patient states she is doing all right. She just spoke with someone yesterday and told them she is fine. States \"I am doing well\" and hung up. Will Follow up at later time. Care Transitions Subsequent and Final Call    Subsequent and Final Calls  Do you have any ongoing symptoms?:  No  Have your medications changed?:  No  Do you have any questions related to your medications?:  No  Do you currently have any active services?:  No  Are you currently active with any services?:  Home Health  Do you have any needs or concerns that I can assist you with?:  No  Care Transitions Interventions                          Other Interventions:             Follow Up  Future Appointments   Date Time Provider Shana Trejo   2020  9:30 AM Mirtha Paige  [facility].   2020  1:00 PM Mirtha Paige MD [facility]/MED [facility]       Vance Blount LPN

## 2020-01-27 ENCOUNTER — HOSPITAL ENCOUNTER (OUTPATIENT)
Dept: WOUND CARE | Age: 85
Discharge: HOME | End: 2020-01-27
Payer: MEDICARE

## 2020-01-27 ENCOUNTER — TELEPHONE (OUTPATIENT)
Dept: CARDIOLOGY CLINIC | Age: 85
End: 2020-01-27

## 2020-01-27 NOTE — TELEPHONE ENCOUNTER
Patient's son Marty Mcclendon) called stating patient has developed a rash from Amiodarone. She was recently started on this during her most recent hospital admission. Please advise.

## 2020-02-05 ENCOUNTER — CARE COORDINATION (OUTPATIENT)
Dept: CASE MANAGEMENT | Age: 85
End: 2020-02-05

## 2020-02-05 NOTE — CARE COORDINATION
[facility] Transitions Follow Up Call    2020    Patient: Jj Johnson  Patient : 1927   MRN: <[id number]>  Reason for Admission:   Discharge Date: 20 RARS: Readmission Risk Score: 14         Spoke with: Borjas Terrance  Patient reports she is feeling good. She ambulates with a walker,doing well with it. She denies sob, palpitations, cp, dizziness, fatigue or cough. Patient does have swelling in feet at times. She elevates them when she does. No problems with her blood sugars, checks once a week. Appetite improved since she was in the hospital. No questions or concerns at this time. Will continue to follow patient. Care Transitions Subsequent and Final Call    Subsequent and Final Calls  Care Transitions Interventions                          Other Interventions:             Follow Up  Future Appointments   Date Time Provider Shana Trejo   2020  1:00 PM Yuri Lynn MD [facility]       Gatito Springer LPN

## 2020-02-17 ENCOUNTER — CARE COORDINATION (OUTPATIENT)
Dept: CASE MANAGEMENT | Age: 85
End: 2020-02-17

## 2020-02-17 NOTE — CARE COORDINATION
[facility] Transitions Follow Up Call    2020    Patient: Estela Dural  Patient : 1927   MRN: <[id number]>    Discharge Date: 20 RARS: Readmission Risk Score: 15         Spoke with: MIGNON Roland    Care Transitions Subsequent and Final Call    Subsequent and Final Calls  Do you have any ongoing symptoms?:  No  Have your medications changed?:  No  Do you have any questions related to your medications?:  No  Do you currently have any active services?:  No  Are you currently active with any services?:  Home Health  Do you have any needs or concerns that I can assist you with?:  No  Identified Barriers:  None  Care Transitions Interventions                          Other Interventions:          CTN spoke to pt's MIGNON Roland who stated pt is doing well. Stated weight and Blood sugars have been \"even\". Stated left foot ulcer is healed and pt is ambulating with a walker now. Stated she is unsure if pt has made a decision regarding DCCV yet. Pt is compliant with mediations, no immediate needs or concerns.     Renetta Multani RN BSN   Care Transitions Nurse  [phone]       Follow Up  Future Appointments   Date Time Provider Shana Trejo   2020  1:00 PM Shagufta Briones MD [facility]       Renetta Multani RN

## 2020-02-26 ENCOUNTER — OFFICE VISIT (OUTPATIENT)
Dept: VASCULAR SURGERY | Age: 85
End: 2020-02-26
Payer: MEDICARE

## 2020-02-26 VITALS
BODY MASS INDEX: 17.83 KG/M2 | RESPIRATION RATE: 16 BRPM | WEIGHT: 107 LBS | SYSTOLIC BLOOD PRESSURE: 124 MMHG | DIASTOLIC BLOOD PRESSURE: 60 MMHG | HEIGHT: 65 IN | HEART RATE: 78 BPM

## 2020-02-26 PROBLEM — L97.521: Status: RESOLVED | Noted: 2019-12-16 | Resolved: 2020-02-26

## 2020-02-26 PROBLEM — I48.91 NEW ONSET A-FIB (HCC): Status: RESOLVED | Noted: 2019-10-14 | Resolved: 2020-02-26

## 2020-02-26 PROBLEM — J90 BILATERAL PLEURAL EFFUSION: Status: RESOLVED | Noted: 2020-01-12 | Resolved: 2020-02-26

## 2020-02-26 PROBLEM — I70.244 ATHEROSCLEROSIS OF NATIVE ARTERY OF LEFT LOWER EXTREMITY WITH ULCERATION OF MIDFOOT (HCC): Status: RESOLVED | Noted: 2019-08-12 | Resolved: 2020-02-26

## 2020-02-26 PROCEDURE — G8427 DOCREV CUR MEDS BY ELIG CLIN: HCPCS | Performed by: SURGERY

## 2020-02-26 PROCEDURE — G8419 CALC BMI OUT NRM PARAM NOF/U: HCPCS | Performed by: SURGERY

## 2020-02-26 PROCEDURE — 1036F TOBACCO NON-USER: CPT | Performed by: SURGERY

## 2020-02-26 PROCEDURE — 99214 OFFICE O/P EST MOD 30 MIN: CPT | Performed by: SURGERY

## 2020-02-26 PROCEDURE — 4040F PNEUMOC VAC/ADMIN/RCVD: CPT | Performed by: SURGERY

## 2020-02-26 PROCEDURE — G8484 FLU IMMUNIZE NO ADMIN: HCPCS | Performed by: SURGERY

## 2020-02-26 PROCEDURE — 1123F ACP DISCUSS/DSCN MKR DOCD: CPT | Performed by: SURGERY

## 2020-02-26 PROCEDURE — 1090F PRES/ABSN URINE INCON ASSESS: CPT | Performed by: SURGERY

## 2020-03-04 ENCOUNTER — CARE COORDINATION (OUTPATIENT)
Dept: CASE MANAGEMENT | Age: 85
End: 2020-03-04

## 2020-03-09 ENCOUNTER — HOSPITAL ENCOUNTER (OUTPATIENT)
Dept: CARDIOLOGY | Age: 85
Discharge: HOME | End: 2020-03-09
Payer: MEDICARE

## 2020-03-09 PROCEDURE — 93880 EXTRACRANIAL BILAT STUDY: CPT

## 2020-03-12 ENCOUNTER — TELEPHONE (OUTPATIENT)
Dept: VASCULAR SURGERY | Age: 85
End: 2020-03-12

## 2020-03-12 NOTE — TELEPHONE ENCOUNTER
----- Message from Lorelle Goodpasture, MD sent at 3/11/2020  5:09 PM EDT -----  Please inform the patient, that she has only mild blockage, 30% only on the right and less than 30% left, not to worry, keep the next appointment

## 2020-03-16 ENCOUNTER — CARE COORDINATION (OUTPATIENT)
Dept: CASE MANAGEMENT | Age: 85
End: 2020-03-16

## 2020-03-16 NOTE — CARE COORDINATION
[facility] Transitions Follow Up Call    3/16/2020    Patient: Marcio Lopez  Patient : 1927   MRN: <[id number]>  Reason for Admission:   Discharge Date: 20 RARS: Readmission Risk Score: 15         Spoke with: Alen Burnettegrim, patient    Care Transitions Subsequent and Final Call    Subsequent and Final Calls  Do you have any ongoing symptoms?:  No  Have your medications changed?:  No  Do you have any questions related to your medications?:  No  Do you currently have any active services?:  No  Are you currently active with any services?:  Home Health  Do you have any needs or concerns that I can assist you with?:  No  Identified Barriers:  None  Care Transitions Interventions  Other Interventions: Follow Up:  Contacted patient for BPCI-A follow up. Alen Lamas states that she is doing \"very well\". Denies having any chest pain/discomfort, palpitations, shortness of breath, increased swelling or weight gain. Reports having slight swelling in her feet but no more than usual.  She is keeping them elevated as much as possible. She continues to weigh herself everyday. Discussed importance of monitoring her weight and when to contact MD with a weight gain of 3 lbs within 24 hours or 5 lbs within a week. She verbalized understanding. She does not have any questions or concerns at this time. Will continue to follow.       Future Appointments   Date Time Provider Shana Trejo   3/10/2021  2:00 PM [facility]   3/10/2021  2:30 PM Bal Simms MD [facility]       Lori Santiago RN

## 2020-03-24 NOTE — PROCEDURES
[address]                  [address] [place]  [facility]                                VASCULAR REPORT    PATIENT NAME: Dwight Zapata                     :        1927  MED REC NO:   [id number]                            ROOM:  ACCOUNT NO:   [de-identified]                           ADMIT DATE: 2020  PROVIDER:     Matias Puckett MD    DATE OF PROCEDURE:  2020    CAROTID ULTRASOUND REPORT    INDICATIONS:  Carotid bruit, history of mild carotid artery stenosis. FINDINGS:  Duplex scanning of the right carotid artery revealed the  patient has moderate plaque with a peak internal carotid velocity of  160, diastolic velocity of 30 cm/sec with the plaque causing  approximately 30% stenosis. Duplex scanning of the left carotid artery revealed moderate intimal  thickening with a peak internal carotid velocity of 263, diastolic  velocity of 35 cm/sec with 20% to 30% stenosis. IMPRESSION:  Right carotid artery stenosis, 30% associated with left  carotid artery stenosis of 20% to 30%.         Lluvia Avila MD    D: 2020 18:08:40       T: 2020 18:35:00     [email or web address]  Job#: [id number]     Doc#: [id number]    CC:  Leighann Scott DO

## 2020-03-26 ENCOUNTER — CARE COORDINATION (OUTPATIENT)
Dept: CASE MANAGEMENT | Age: 85
End: 2020-03-26

## 2020-03-30 ENCOUNTER — CARE COORDINATION (OUTPATIENT)
Dept: CASE MANAGEMENT | Age: 85
End: 2020-03-30

## 2020-03-30 NOTE — CARE COORDINATION
[facility] Transitions Follow Up Call    3/30/2020    Patient: Lulu Mancilla  Patient : 1927   MRN: <[id number]>    Discharge Date: 20 RARS: Readmission Risk Score: 15    Spoke with: Helen Monroy Makayla Transitions Subsequent and Final Call    Subsequent and Final Calls  Do you have any ongoing symptoms?:  No  Have your medications changed?:  No  Do you have any questions related to your medications?:  No  Are you currently active with any services?:  Home Health  Do you have any needs or concerns that I can assist you with?:  No  Identified Barriers:  None  Care Transitions Interventions  Other Interventions: Follow Up: Placed a call to the home and spoke with patient. Verified name, date of birth. She reported that she is doing very well. She reported that she is not having any chest pain, shortness of breath, palpitations, dizziness, edema or other issues. She said she is weighing on a daily basis and has not had any weight gain. She is sleeping well. Her appetite is good and she is eating well. She is sticking to a low sodium diet. She reported that she has someone bringing in groceries and other necessaries for her. She is not going out anywhere. Reminded of signs and symptoms of COVID 19, infection control measures, etc.  Voiced understanding. To call prn issues. Team will follow up with calls as indicated.      Plan for next call - assess overall status, CP status, weight, edema, pain, appetite, sleep patterns, abnormal signs and symptoms, effectiveness of medications, activity level and tolerance, falls/other unexpected events, findings from follow up appointments, medication/treatment changes, any additional teaching needs, etc.      Future Appointments   Date Time Provider Shana Trejo   3/10/2021  2:00 PM [address]  [facility]   3/10/2021  2:30 PM Alysia Garcia MD [facility]       Kathryn Reyes RN

## 2021-03-02 DIAGNOSIS — I73.9 PVD (PERIPHERAL VASCULAR DISEASE) WITH CLAUDICATION (HCC): Primary | ICD-10-CM

## 2022-01-12 ENCOUNTER — HOSPITAL ENCOUNTER (INPATIENT)
Age: 87
LOS: 1 days | Discharge: HOME | DRG: 177 | End: 2022-01-14
Attending: EMERGENCY MEDICINE | Admitting: FAMILY MEDICINE
Payer: MEDICARE

## 2022-01-12 ENCOUNTER — APPOINTMENT (OUTPATIENT)
Dept: GENERAL RADIOLOGY | Age: 87
DRG: 177 | End: 2022-01-12
Payer: MEDICARE

## 2022-01-12 DIAGNOSIS — J12.82 PNEUMONIA DUE TO COVID-19 VIRUS: Primary | ICD-10-CM

## 2022-01-12 DIAGNOSIS — U07.1 PNEUMONIA DUE TO COVID-19 VIRUS: Primary | ICD-10-CM

## 2022-01-12 DIAGNOSIS — R09.02 HYPOXIA: ICD-10-CM

## 2022-01-12 LAB
ALBUMIN SERPL-MCNC: 3.3 G/DL (ref 3.5–5.2)
ALP BLD-CCNC: 54 U/L (ref 35–104)
ALT SERPL-CCNC: 16 U/L (ref 0–32)
ANION GAP SERPL CALCULATED.3IONS-SCNC: 13 MMOL/L (ref 7–16)
AST SERPL-CCNC: 15 U/L (ref 0–31)
BILIRUB SERPL-MCNC: 1.1 MG/DL (ref 0–1.2)
BUN BLDV-MCNC: 17 MG/DL (ref 6–23)
CALCIUM SERPL-MCNC: 9 MG/DL (ref 8.6–10.2)
CHLORIDE BLD-SCNC: 95 MMOL/L (ref 98–107)
CO2: 28 MMOL/L (ref 22–29)
CREAT SERPL-MCNC: 0.6 MG/DL (ref 0.5–1)
D DIMER: 372 NG/ML DDU
GFR AFRICAN AMERICAN: >60
GFR NON-AFRICAN AMERICAN: >60 ML/MIN/1.73
GLUCOSE BLD-MCNC: 278 MG/DL (ref 74–99)
POTASSIUM SERPL-SCNC: 4.2 MMOL/L (ref 3.5–5)
PRO-BNP: 3071 PG/ML (ref 0–450)
SODIUM BLD-SCNC: 136 MMOL/L (ref 132–146)
TOTAL PROTEIN: 7 G/DL (ref 6.4–8.3)
TROPONIN, HIGH SENSITIVITY: 29 NG/L (ref 0–9)

## 2022-01-12 PROCEDURE — 85025 COMPLETE CBC W/AUTO DIFF WBC: CPT

## 2022-01-12 PROCEDURE — 85378 FIBRIN DEGRADE SEMIQUANT: CPT

## 2022-01-12 PROCEDURE — 6360000002 HC RX W HCPCS: Performed by: EMERGENCY MEDICINE

## 2022-01-12 PROCEDURE — 93005 ELECTROCARDIOGRAM TRACING: CPT | Performed by: EMERGENCY MEDICINE

## 2022-01-12 PROCEDURE — 84484 ASSAY OF TROPONIN QUANT: CPT

## 2022-01-12 PROCEDURE — 96374 THER/PROPH/DIAG INJ IV PUSH: CPT

## 2022-01-12 PROCEDURE — 80053 COMPREHEN METABOLIC PANEL: CPT

## 2022-01-12 PROCEDURE — 94640 AIRWAY INHALATION TREATMENT: CPT

## 2022-01-12 PROCEDURE — 6370000000 HC RX 637 (ALT 250 FOR IP): Performed by: EMERGENCY MEDICINE

## 2022-01-12 PROCEDURE — 71045 X-RAY EXAM CHEST 1 VIEW: CPT

## 2022-01-12 PROCEDURE — 83880 ASSAY OF NATRIURETIC PEPTIDE: CPT

## 2022-01-12 PROCEDURE — 99284 EMERGENCY DEPT VISIT MOD MDM: CPT

## 2022-01-12 RX ORDER — DEXAMETHASONE SODIUM PHOSPHATE 10 MG/ML
10 INJECTION INTRAMUSCULAR; INTRAVENOUS ONCE
Status: COMPLETED | OUTPATIENT
Start: 2022-01-12 | End: 2022-01-12

## 2022-01-12 RX ORDER — IPRATROPIUM BROMIDE AND ALBUTEROL SULFATE 2.5; .5 MG/3ML; MG/3ML
1 SOLUTION RESPIRATORY (INHALATION)
Status: COMPLETED | OUTPATIENT
Start: 2022-01-12 | End: 2022-01-12

## 2022-01-12 RX ADMIN — IPRATROPIUM BROMIDE AND ALBUTEROL SULFATE 1 AMPULE: .5; 3 SOLUTION RESPIRATORY (INHALATION) at 23:49

## 2022-01-12 RX ADMIN — IPRATROPIUM BROMIDE AND ALBUTEROL SULFATE 1 AMPULE: .5; 3 SOLUTION RESPIRATORY (INHALATION) at 23:48

## 2022-01-12 RX ADMIN — DEXAMETHASONE SODIUM PHOSPHATE 10 MG: 10 INJECTION INTRAMUSCULAR; INTRAVENOUS at 23:07

## 2022-01-12 RX ADMIN — IPRATROPIUM BROMIDE AND ALBUTEROL SULFATE 1 AMPULE: .5; 3 SOLUTION RESPIRATORY (INHALATION) at 23:50

## 2022-01-13 PROBLEM — U07.1 PNEUMONIA DUE TO COVID-19 VIRUS: Status: ACTIVE | Noted: 2022-01-13

## 2022-01-13 PROBLEM — J12.82 PNEUMONIA DUE TO COVID-19 VIRUS: Status: ACTIVE | Noted: 2022-01-13

## 2022-01-13 LAB
ALBUMIN SERPL-MCNC: 2.8 G/DL (ref 3.5–5.2)
ALP BLD-CCNC: 49 U/L (ref 35–104)
ALT SERPL-CCNC: 12 U/L (ref 0–32)
ANION GAP SERPL CALCULATED.3IONS-SCNC: 13 MMOL/L (ref 7–16)
ANISOCYTOSIS: ABNORMAL
ANISOCYTOSIS: ABNORMAL
AST SERPL-CCNC: 14 U/L (ref 0–31)
BASOPHILS ABSOLUTE: 0 E9/L (ref 0–0.2)
BASOPHILS ABSOLUTE: 0 E9/L (ref 0–0.2)
BASOPHILS RELATIVE PERCENT: 0.1 % (ref 0–2)
BASOPHILS RELATIVE PERCENT: 0.2 % (ref 0–2)
BILIRUB SERPL-MCNC: 1 MG/DL (ref 0–1.2)
BUN BLDV-MCNC: 19 MG/DL (ref 6–23)
BURR CELLS: ABNORMAL
BURR CELLS: ABNORMAL
C-REACTIVE PROTEIN: 5.7 MG/DL (ref 0–0.4)
C-REACTIVE PROTEIN: 5.8 MG/DL (ref 0–0.4)
CALCIUM SERPL-MCNC: 8.7 MG/DL (ref 8.6–10.2)
CHLORIDE BLD-SCNC: 93 MMOL/L (ref 98–107)
CO2: 26 MMOL/L (ref 22–29)
CREAT SERPL-MCNC: 0.6 MG/DL (ref 0.5–1)
D DIMER: 405 NG/ML DDU
EKG ATRIAL RATE: 65 BPM
EKG P AXIS: 97 DEGREES
EKG P-R INTERVAL: 208 MS
EKG Q-T INTERVAL: 462 MS
EKG QRS DURATION: 108 MS
EKG QTC CALCULATION (BAZETT): 480 MS
EKG R AXIS: -50 DEGREES
EKG T AXIS: -87 DEGREES
EKG VENTRICULAR RATE: 65 BPM
EOSINOPHILS ABSOLUTE: 0 E9/L (ref 0.05–0.5)
EOSINOPHILS ABSOLUTE: 0 E9/L (ref 0.05–0.5)
EOSINOPHILS RELATIVE PERCENT: 0 % (ref 0–6)
EOSINOPHILS RELATIVE PERCENT: 3.9 % (ref 0–6)
FERRITIN: 107 NG/ML
GFR AFRICAN AMERICAN: >60
GFR NON-AFRICAN AMERICAN: >60 ML/MIN/1.73
GLUCOSE BLD-MCNC: 320 MG/DL (ref 74–99)
HBA1C MFR BLD: 9.2 % (ref 4–5.6)
HCT VFR BLD CALC: 33.1 % (ref 34–48)
HCT VFR BLD CALC: 36.1 % (ref 34–48)
HEMOGLOBIN: 10.8 G/DL (ref 11.5–15.5)
HEMOGLOBIN: 11.7 G/DL (ref 11.5–15.5)
LACTATE DEHYDROGENASE: 146 U/L (ref 135–214)
LYMPHOCYTES ABSOLUTE: 2.82 E9/L (ref 1.5–4)
LYMPHOCYTES ABSOLUTE: 3.18 E9/L (ref 1.5–4)
LYMPHOCYTES RELATIVE PERCENT: 15.7 % (ref 20–42)
LYMPHOCYTES RELATIVE PERCENT: 20 % (ref 20–42)
MCH RBC QN AUTO: 28.7 PG (ref 26–35)
MCH RBC QN AUTO: 29.3 PG (ref 26–35)
MCHC RBC AUTO-ENTMCNC: 32.4 % (ref 32–34.5)
MCHC RBC AUTO-ENTMCNC: 32.6 % (ref 32–34.5)
MCV RBC AUTO: 88.5 FL (ref 80–99.9)
MCV RBC AUTO: 89.7 FL (ref 80–99.9)
METER GLUCOSE: 302 MG/DL (ref 74–99)
METER GLUCOSE: 380 MG/DL (ref 74–99)
METER GLUCOSE: 381 MG/DL (ref 74–99)
METER GLUCOSE: 404 MG/DL (ref 74–99)
MONOCYTES ABSOLUTE: 0.48 E9/L (ref 0.1–0.95)
MONOCYTES ABSOLUTE: 1.58 E9/L (ref 0.1–0.95)
MONOCYTES RELATIVE PERCENT: 2.6 % (ref 2–12)
MONOCYTES RELATIVE PERCENT: 8.7 % (ref 2–12)
NEUTROPHILS ABSOLUTE: 12.24 E9/L (ref 1.8–7.3)
NEUTROPHILS ABSOLUTE: 13.38 E9/L (ref 1.8–7.3)
NEUTROPHILS RELATIVE PERCENT: 75.6 % (ref 43–80)
NEUTROPHILS RELATIVE PERCENT: 77.4 % (ref 43–80)
OVALOCYTES: ABNORMAL
OVALOCYTES: ABNORMAL
PDW BLD-RTO: 13.3 FL (ref 11.5–15)
PDW BLD-RTO: 13.3 FL (ref 11.5–15)
PLATELET # BLD: 185 E9/L (ref 130–450)
PLATELET # BLD: 212 E9/L (ref 130–450)
PMV BLD AUTO: 10.1 FL (ref 7–12)
PMV BLD AUTO: 9.9 FL (ref 7–12)
POIKILOCYTES: ABNORMAL
POIKILOCYTES: ABNORMAL
POLYCHROMASIA: ABNORMAL
POTASSIUM REFLEX MAGNESIUM: 3.9 MMOL/L (ref 3.5–5)
PROCALCITONIN: 0.37 NG/ML (ref 0–0.08)
RBC # BLD: 3.69 E12/L (ref 3.5–5.5)
RBC # BLD: 4.08 E12/L (ref 3.5–5.5)
SARS-COV-2, NAAT: DETECTED
SCHISTOCYTES: ABNORMAL
SMUDGE CELLS: ABNORMAL
SODIUM BLD-SCNC: 132 MMOL/L (ref 132–146)
TOTAL PROTEIN: 5.9 G/DL (ref 6.4–8.3)
WBC # BLD: 15.9 E9/L (ref 4.5–11.5)
WBC # BLD: 17.6 E9/L (ref 4.5–11.5)

## 2022-01-13 PROCEDURE — 85025 COMPLETE CBC W/AUTO DIFF WBC: CPT

## 2022-01-13 PROCEDURE — 6370000000 HC RX 637 (ALT 250 FOR IP): Performed by: FAMILY MEDICINE

## 2022-01-13 PROCEDURE — 82962 GLUCOSE BLOOD TEST: CPT

## 2022-01-13 PROCEDURE — 80053 COMPREHEN METABOLIC PANEL: CPT

## 2022-01-13 PROCEDURE — 85378 FIBRIN DEGRADE SEMIQUANT: CPT

## 2022-01-13 PROCEDURE — 82728 ASSAY OF FERRITIN: CPT

## 2022-01-13 PROCEDURE — 87635 SARS-COV-2 COVID-19 AMP PRB: CPT

## 2022-01-13 PROCEDURE — 2700000000 HC OXYGEN THERAPY PER DAY

## 2022-01-13 PROCEDURE — 6360000002 HC RX W HCPCS: Performed by: FAMILY MEDICINE

## 2022-01-13 PROCEDURE — 86140 C-REACTIVE PROTEIN: CPT

## 2022-01-13 PROCEDURE — 83615 LACTATE (LD) (LDH) ENZYME: CPT

## 2022-01-13 PROCEDURE — 83036 HEMOGLOBIN GLYCOSYLATED A1C: CPT

## 2022-01-13 PROCEDURE — 87449 NOS EACH ORGANISM AG IA: CPT

## 2022-01-13 PROCEDURE — 2140000000 HC CCU INTERMEDIATE R&B

## 2022-01-13 PROCEDURE — 93010 ELECTROCARDIOGRAM REPORT: CPT | Performed by: INTERNAL MEDICINE

## 2022-01-13 PROCEDURE — 2580000003 HC RX 258: Performed by: FAMILY MEDICINE

## 2022-01-13 PROCEDURE — 84145 PROCALCITONIN (PCT): CPT

## 2022-01-13 RX ORDER — DEXTROSE MONOHYDRATE 25 G/50ML
12.5 INJECTION, SOLUTION INTRAVENOUS PRN
Status: DISCONTINUED | OUTPATIENT
Start: 2022-01-13 | End: 2022-01-14 | Stop reason: SDUPTHER

## 2022-01-13 RX ORDER — ACETAMINOPHEN 650 MG/1
650 SUPPOSITORY RECTAL EVERY 6 HOURS PRN
Status: DISCONTINUED | OUTPATIENT
Start: 2022-01-13 | End: 2022-01-14 | Stop reason: HOSPADM

## 2022-01-13 RX ORDER — ONDANSETRON 2 MG/ML
4 INJECTION INTRAMUSCULAR; INTRAVENOUS EVERY 6 HOURS PRN
Status: DISCONTINUED | OUTPATIENT
Start: 2022-01-13 | End: 2022-01-14 | Stop reason: HOSPADM

## 2022-01-13 RX ORDER — DILTIAZEM HYDROCHLORIDE 120 MG/1
120 CAPSULE, COATED, EXTENDED RELEASE ORAL DAILY
Status: DISCONTINUED | OUTPATIENT
Start: 2022-01-13 | End: 2022-01-14 | Stop reason: HOSPADM

## 2022-01-13 RX ORDER — DEXTROSE MONOHYDRATE 50 MG/ML
100 INJECTION, SOLUTION INTRAVENOUS PRN
Status: DISCONTINUED | OUTPATIENT
Start: 2022-01-13 | End: 2022-01-14 | Stop reason: SDUPTHER

## 2022-01-13 RX ORDER — POLYETHYLENE GLYCOL 3350 17 G/17G
17 POWDER, FOR SOLUTION ORAL DAILY PRN
Status: DISCONTINUED | OUTPATIENT
Start: 2022-01-13 | End: 2022-01-14 | Stop reason: HOSPADM

## 2022-01-13 RX ORDER — GUAIFENESIN/DEXTROMETHORPHAN 100-10MG/5
5 SYRUP ORAL EVERY 4 HOURS PRN
Status: DISCONTINUED | OUTPATIENT
Start: 2022-01-13 | End: 2022-01-14 | Stop reason: HOSPADM

## 2022-01-13 RX ORDER — METOPROLOL SUCCINATE 25 MG/1
100 TABLET, EXTENDED RELEASE ORAL 2 TIMES DAILY
Status: DISCONTINUED | OUTPATIENT
Start: 2022-01-13 | End: 2022-01-14 | Stop reason: HOSPADM

## 2022-01-13 RX ORDER — METFORMIN HYDROCHLORIDE 500 MG/1
500 TABLET, EXTENDED RELEASE ORAL
Status: DISCONTINUED | OUTPATIENT
Start: 2022-01-13 | End: 2022-01-14

## 2022-01-13 RX ORDER — ACETAMINOPHEN 325 MG/1
650 TABLET ORAL EVERY 6 HOURS PRN
Status: DISCONTINUED | OUTPATIENT
Start: 2022-01-13 | End: 2022-01-14 | Stop reason: HOSPADM

## 2022-01-13 RX ORDER — SODIUM CHLORIDE 0.9 % (FLUSH) 0.9 %
5-40 SYRINGE (ML) INJECTION EVERY 12 HOURS SCHEDULED
Status: DISCONTINUED | OUTPATIENT
Start: 2022-01-13 | End: 2022-01-14 | Stop reason: HOSPADM

## 2022-01-13 RX ORDER — DEXAMETHASONE SODIUM PHOSPHATE 10 MG/ML
6 INJECTION INTRAMUSCULAR; INTRAVENOUS EVERY 24 HOURS
Status: DISCONTINUED | OUTPATIENT
Start: 2022-01-13 | End: 2022-01-14 | Stop reason: HOSPADM

## 2022-01-13 RX ORDER — FUROSEMIDE 20 MG/1
20 TABLET ORAL DAILY
Status: DISCONTINUED | OUTPATIENT
Start: 2022-01-13 | End: 2022-01-14 | Stop reason: HOSPADM

## 2022-01-13 RX ORDER — ASCORBIC ACID 500 MG
500 TABLET ORAL DAILY
Status: DISCONTINUED | OUTPATIENT
Start: 2022-01-13 | End: 2022-01-14 | Stop reason: HOSPADM

## 2022-01-13 RX ORDER — CHOLECALCIFEROL (VITAMIN D3) 50 MCG
2000 TABLET ORAL DAILY
Status: DISCONTINUED | OUTPATIENT
Start: 2022-01-13 | End: 2022-01-14 | Stop reason: HOSPADM

## 2022-01-13 RX ORDER — ONDANSETRON 4 MG/1
4 TABLET, ORALLY DISINTEGRATING ORAL EVERY 8 HOURS PRN
Status: DISCONTINUED | OUTPATIENT
Start: 2022-01-13 | End: 2022-01-14 | Stop reason: HOSPADM

## 2022-01-13 RX ORDER — ZINC SULFATE 50(220)MG
50 CAPSULE ORAL DAILY
Status: DISCONTINUED | OUTPATIENT
Start: 2022-01-13 | End: 2022-01-14 | Stop reason: HOSPADM

## 2022-01-13 RX ORDER — INSULIN GLARGINE 100 [IU]/ML
0.25 INJECTION, SOLUTION SUBCUTANEOUS NIGHTLY
Status: DISCONTINUED | OUTPATIENT
Start: 2022-01-13 | End: 2022-01-14

## 2022-01-13 RX ORDER — SODIUM CHLORIDE 9 MG/ML
25 INJECTION, SOLUTION INTRAVENOUS PRN
Status: DISCONTINUED | OUTPATIENT
Start: 2022-01-13 | End: 2022-01-14 | Stop reason: HOSPADM

## 2022-01-13 RX ORDER — SODIUM CHLORIDE 0.9 % (FLUSH) 0.9 %
5-40 SYRINGE (ML) INJECTION PRN
Status: DISCONTINUED | OUTPATIENT
Start: 2022-01-13 | End: 2022-01-14 | Stop reason: HOSPADM

## 2022-01-13 RX ORDER — ATORVASTATIN CALCIUM 10 MG/1
10 TABLET, FILM COATED ORAL DAILY
Status: DISCONTINUED | OUTPATIENT
Start: 2022-01-13 | End: 2022-01-14 | Stop reason: HOSPADM

## 2022-01-13 RX ORDER — NICOTINE POLACRILEX 4 MG
15 LOZENGE BUCCAL PRN
Status: DISCONTINUED | OUTPATIENT
Start: 2022-01-13 | End: 2022-01-14 | Stop reason: SDUPTHER

## 2022-01-13 RX ADMIN — APIXABAN 2.5 MG: 2.5 TABLET, FILM COATED ORAL at 21:00

## 2022-01-13 RX ADMIN — ZINC SULFATE 220 MG (50 MG) CAPSULE 50 MG: CAPSULE at 09:24

## 2022-01-13 RX ADMIN — Medication 10 ML: at 09:24

## 2022-01-13 RX ADMIN — INSULIN LISPRO 5 UNITS: 100 INJECTION, SOLUTION INTRAVENOUS; SUBCUTANEOUS at 13:42

## 2022-01-13 RX ADMIN — OXYCODONE HYDROCHLORIDE AND ACETAMINOPHEN 500 MG: 500 TABLET ORAL at 09:24

## 2022-01-13 RX ADMIN — DEXAMETHASONE SODIUM PHOSPHATE 6 MG: 10 INJECTION INTRAMUSCULAR; INTRAVENOUS at 06:15

## 2022-01-13 RX ADMIN — Medication 2000 UNITS: at 09:24

## 2022-01-13 RX ADMIN — INSULIN GLARGINE 12 UNITS: 100 INJECTION, SOLUTION SUBCUTANEOUS at 21:54

## 2022-01-13 RX ADMIN — INSULIN LISPRO 3 UNITS: 100 INJECTION, SOLUTION INTRAVENOUS; SUBCUTANEOUS at 21:55

## 2022-01-13 RX ADMIN — METOPROLOL SUCCINATE 100 MG: 25 TABLET, FILM COATED, EXTENDED RELEASE ORAL at 09:24

## 2022-01-13 RX ADMIN — GUAIFENESIN AND DEXTROMETHORPHAN 5 ML: 100; 10 SYRUP ORAL at 18:46

## 2022-01-13 RX ADMIN — FUROSEMIDE 20 MG: 20 TABLET ORAL at 09:24

## 2022-01-13 RX ADMIN — INSULIN LISPRO 5 UNITS: 100 INJECTION, SOLUTION INTRAVENOUS; SUBCUTANEOUS at 18:46

## 2022-01-13 RX ADMIN — ATORVASTATIN CALCIUM 10 MG: 10 TABLET, FILM COATED ORAL at 09:24

## 2022-01-13 RX ADMIN — Medication 10 ML: at 21:50

## 2022-01-13 RX ADMIN — APIXABAN 2.5 MG: 2.5 TABLET, FILM COATED ORAL at 09:24

## 2022-01-13 RX ADMIN — CEFTRIAXONE 1000 MG: 1 INJECTION, POWDER, FOR SOLUTION INTRAMUSCULAR; INTRAVENOUS at 05:25

## 2022-01-13 RX ADMIN — INSULIN LISPRO 4 UNITS: 100 INJECTION, SOLUTION INTRAVENOUS; SUBCUTANEOUS at 09:24

## 2022-01-13 RX ADMIN — METOPROLOL SUCCINATE 100 MG: 25 TABLET, FILM COATED, EXTENDED RELEASE ORAL at 21:00

## 2022-01-13 ASSESSMENT — PAIN SCALES - GENERAL: PAINLEVEL_OUTOF10: 0

## 2022-01-13 NOTE — ED PROVIDER NOTES
Department of Emergency Medicine   ED  Provider Note  Admit Date/RoomTime: 1/12/2022 10:09 PM  ED Room: 31/31          History of Present Illness:  1/13/22, Time: 3:39 AM EST  Chief Complaint   Patient presents with    Positive For Covid-19     (+) 1/2/22    Fatigue     Pt's family called 46 with concern that pt appeared weak. Pt free of complaints - denies sob, chest pain, dizziness, n/v/d/fever/chills. Per EMS, pt 86% on RA -> 98% 10L NRB en route. Ne Mendoza is a 80 y.o. female presenting to the ED for shortness of breath. Patient been short of breath the past week. Came on gradually, nothing makes it better or worse, no associated pain. She states she does have a history of COPD. She did test positive for COVID at home a few days ago. She was not vaccinated. EMS reports he was 80% on room air on arrival.  Denies fever, chills, nausea, vomiting, change in bowel or bladder, neck pain or symptoms, paresthesias, lethargy, or any other symptoms or complaints.     Review of Systems:   Pertinent positives and negatives are stated within HPI, all other systems reviewed and are negative.        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 ([address] Bronchitis, Carotid stenosis, Diabetes ([address] Diabetic ulcer of left foot associated with type 2 diabetes mellitus, with necrosis of muscle ([address] DM (diabetes mellitus), type 2 ([address] Hyperlipidemia, Hypertension, Ischemic ulcer of foot, limited to breakdown of skin, left ([address] PVD (peripheral vascular disease) with claudication ([address] Stenosis of both external carotid arteries, Type 2 diabetes mellitus with left diabetic foot ulcer ([address] Ulcerated, foot, left, limited to breakdown of skin ([facility] [address] Ulcerated, foot, left, limited to breakdown of skin ([address] Ulcerated, foot, left, with fat layer exposed ([address] and Vitamin D deficiency. Past Surgical History:  has a past surgical history that includes eye surgery (Bilateral); Appendectomy; Tonsillectomy; and [place] tooth extraction. Social History:  reports that she has never smoked. She has never used smokeless tobacco. She reports that she does not drink alcohol and does not use drugs. Family History: family history is not on file. . Unless otherwise noted, family history is non contributory    The patients home medications have been reviewed. Allergies: Pcn [penicillins] and Amiodarone        ---------------------------------------------------PHYSICAL EXAM--------------------------------------    Constitutional/General: Alert and oriented x3  Head: Normocephalic and atraumatic  Eyes: PERRL, EOMI, sclera non icteric  Mouth: Oropharynx clear, handling secretions, no trismus, no asymmetry of the posterior oropharynx or uvular edema  Neck: Supple, full ROM, no stridor, no meningeal signs  Respiratory: Coarse diffusely  Cardiovascular:  Regular rate. Regular rhythm. 2+ distal pulses. Equal extremity pulses. Chest: No chest wall tenderness  GI:  Abdomen Soft, Non tender, Non distended. No rebound, guarding, or rigidity. No pulsatile masses. Musculoskeletal: Moves all extremities x 4. Warm and well perfused, no clubbing, cyanosis, or edema. Capillary refill <3 seconds  Integument: skin warm and dry. No rashes. Neurologic: GCS 15, no focal deficits, symmetric strength 5/5 in the upper and lower extremities bilaterally  Psychiatric: Normal Affect          -------------------------------------------------- RESULTS -------------------------------------------------  I have personally reviewed all laboratory and imaging results for this patient. Results are listed below.      LABS: (Lab results interpreted by me)  Results for orders placed or performed during the hospital encounter of 01/12/22   COVID-19, Rapid    Specimen: Nasopharyngeal Swab   Result Value Ref Range    SARS-CoV-2, NAAT DETECTED (A) Not Detected   CBC Auto Differential   Result Value Ref Range    WBC 17.6 (H) 4.5 - 11.5 E9/L    RBC 4.08 3.50 - 5.50 E12/L    Hemoglobin 11.7 11.5 - 15.5 g/dL    Hematocrit 36.1 34.0 - 48.0 %    MCV 88.5 80.0 - 99.9 fL    MCH 28.7 26.0 - 35.0 pg    MCHC 32.4 32.0 - 34.5 %    RDW 13.3 11.5 - 15.0 fL    Platelets 168 939 - 379 E9/L    MPV 9.9 7.0 - 12.0 fL    Neutrophils % 75.6 43.0 - 80.0 %    Lymphocytes % 15.7 (L) 20.0 - 42.0 %    Monocytes % 8.7 2.0 - 12.0 %    Eosinophils % 3.9 0.0 - 6.0 %    Basophils % 0.2 0.0 - 2.0 %    Neutrophils Absolute 13.38 (H) 1.80 - 7.30 E9/L    Lymphocytes Absolute 2.82 1.50 - 4.00 E9/L    Monocytes Absolute 1.58 (H) 0.10 - 0.95 E9/L    Eosinophils Absolute 0.00 (L) 0.05 - 0.50 E9/L    Basophils Absolute 0.00 0.00 - 0.20 E9/L    Anisocytosis 1+     Polychromasia 1+     Poikilocytes 2+     Schistocytes 1+     Gilbert Cells 2+     Ovalocytes 3+    Troponin   Result Value Ref Range    Troponin, High Sensitivity 29 (H) 0 - 9 ng/L   Brain Natriuretic Peptide   Result Value Ref Range    Pro-BNP 3,071 (H) 0 - 450 pg/mL   Comprehensive Metabolic Panel   Result Value Ref Range    Sodium 136 132 - 146 mmol/L    Potassium 4.2 3.5 - 5.0 mmol/L    Chloride 95 (L) 98 - 107 mmol/L    CO2 28 22 - 29 mmol/L    Anion Gap 13 7 - 16 mmol/L    Glucose 278 (H) 74 - 99 mg/dL    BUN 17 6 - 23 mg/dL    CREATININE 0.6 0.5 - 1.0 mg/dL    GFR Non-African American >60 >=60 mL/min/1.73    GFR African American >60     Calcium 9.0 8.6 - 10.2 mg/dL    Total Protein 7.0 6.4 - 8.3 g/dL    Albumin 3.3 (L) 3.5 - 5.2 g/dL    Total Bilirubin 1.1 0.0 - 1.2 mg/dL    Alkaline Phosphatase 54 35 - 104 U/L    ALT 16 0 - 32 U/L    AST 15 0 - 31 U/L   D-Dimer, Quantitative   Result Value Ref Range    D-Dimer, Quant 372 ng/mL DDU   EKG 12 Lead   Result Value Ref Range    Ventricular Rate 65 BPM    Atrial Rate 65 BPM    P-R Interval 208 ms    QRS Duration 108 ms    Q-T Interval 462 ms    QTc Calculation (Bazett) 480 ms    P Axis 97 degrees    R Axis -50 degrees    T Axis -87 degrees   ,       RADIOLOGY:  Interpreted by Radiologist unless otherwise specified  XR CHEST PORTABLE   Final Result   Mild bibasilar infiltrates. EKG Interpretation  Interpreted by emergency department physician, Dr. Trey Johnson, rate 65, no STEMI      ------------------------- NURSING NOTES AND VITALS REVIEWED ---------------------------   The nursing notes within the ED encounter and vital signs as below have been reviewed by myself  BP (!) 138/59   Pulse 67   Temp 97.6 °F (36.4 °C) (Oral)   Resp 15   Ht 5' 5\" (1.651 m)   Wt 108 lb (49 kg)   SpO2 93%   BMI 17.97 kg/m²     Oxygen Saturation Interpretation: Normal    The patients available past medical records and past encounters were reviewed. ------------------------------ ED COURSE/MEDICAL DECISION MAKING----------------------  Medications   cefTRIAXone (ROCEPHIN) 1,000 mg in sterile water 10 mL IV syringe (has no administration in time range)   dexamethasone (DECADRON) injection 10 mg (10 mg IntraVENous Given 1/12/22 2307)   ipratropium-albuterol (DUONEB) nebulizer solution 1 ampule (1 ampule Inhalation Given 1/12/22 2350)           The cardiac monitor revealed sinus with a heart rate in the 80s as interpreted by me. The cardiac monitor was ordered secondary to the patient's SOB and to monitor the patient for dysrhythmia. CPT [id number]         Medical Decision Making:    Patient was placed on room air, pulse ox did drop down about 84%. She was coarse diffusely. Was placed on 6 L, pulse ox increased about 90%. Then she received DuoNeb x3 along with Decadron. This did improve her oxygen saturation. Labs and imaging reviewed. Reevaluation, she is resting comfortably.   No need to escalate her airway at this

## 2022-01-13 NOTE — PROGRESS NOTES
f-up visit for today please see H+P dated 1/13 for details  No fevers recorded since hospitalization  Tested + jan 2 for covid 19  unvaxed  cxr in ED 1/12 reveals bibasilar infiltrates  Patient denied fever in her hpi per documentaTION from [place]  Briefly    Diagnosis list of conditions  COVID-19 pneumonia   -Acute hypoxia now on 2L  -Urinary tract infection   -Leukocytosis -no bandemia or blasts  -Diabetes mellitus type 2   -Hypertension   -Hyperlipidemia   -Chronically anticoagulated on Eliquis  vss listed   98 (36.7) 16 68 140/65 -- -- -- -- 96 Nasal      Na 132/  K 3.9  CO2 26  Cr 0.6  Wbc 15.9/Hgb 10.8/Hcy 33.1/PLTb 185  Therapeutics  apixiban  Ceftriaxone  Decadron    Will check procalcitonin  Urine legionella/pneumococcal

## 2022-01-13 NOTE — H&P
Hospitalist History & Physical      PCP: Wen Goodwin MD    Date of Service: Pt seen/examined on 1/13/2022     Chief Complaint:  had concerns including Positive For Covid-19 ((+) 1/2/22) and Fatigue (Pt's family called [phone] with concern that pt appeared weak. Pt free of complaints - denies sob, chest pain, dizziness, n/v/d/fever/chills. Per EMS, pt 86% on RA -> 98% 10L NRB en route. ). History Of Present Illness:    Ms. Katelyn Nixon, a 80y.o. 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 ([address] Bronchitis, Carotid stenosis, Diabetes ([address] Diabetic ulcer of left foot associated with type 2 diabetes mellitus, with necrosis of muscle ([address] DM (diabetes mellitus), type 2 ([address] Hyperlipidemia, Hypertension, Ischemic ulcer of foot, limited to breakdown of skin, left ([address] PVD (peripheral vascular disease) with claudication ([address] Stenosis of both external carotid arteries, Type 2 diabetes mellitus with left diabetic foot ulcer ([address] Ulcerated, foot, left, limited to breakdown of skin ([address] Ulcerated, foot, left, limited to breakdown of skin ([address] Ulcerated, foot, left, with fat layer exposed ([address] and Vitamin D deficiency. Presented to the emergency department with complaints of fatigue and weakness. She tested positive on a home test on January 2. Patient denies shortness of breath, chest pain, dizziness, fever, chills, nausea, vomiting. Patient was 86% on room air. She is currently 92% on 5 L nasal cannula. Patient is afebrile. Vital signs within normal limits and stable. Laboratory studies reveal glucose 278, proBNP 3071, troponin of 29, WBC 17.6. Patient has a urine culture that is positive for E. coli. Chest x-ray shows bibasilar infiltrates.   Patient was given ceftriaxone, dexamethasone and breathing treatments in the emergency department and medicine was consulted for admission. 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 ([address] 8/12/2019    Bronchitis 11/04/2019    Carotid stenosis     Diabetes ([address]     Diabetic ulcer of left foot associated with type 2 diabetes mellitus, with necrosis of muscle ([address] 10/7/2019    DM (diabetes mellitus), type 2 ([address] 1/12/2020    Hyperlipidemia     Hypertension     Ischemic ulcer of foot, limited to breakdown of skin, left ([address] 12/16/2019    PVD (peripheral vascular disease) with claudication ([address] 1/20/2020    Stenosis of both external carotid arteries 2/28/2018    Type 2 diabetes mellitus with left diabetic foot ulcer ([address] 10/7/2019    Ulcerated, foot, left, limited to breakdown of skin ([address] 8/12/2019    Ulcerated, foot, left, limited to breakdown of skin ([address] 9/23/2019    Ulcerated, foot, left, with fat layer exposed ([address] 8/12/2019    Vitamin D deficiency        Past Surgical History:   Procedure Laterality Date    APPENDECTOMY      EYE SURGERY Bilateral     cataract    TONSILLECTOMY      WISDOM TOOTH EXTRACTION         Prior to Admission medications    Medication Sig Start Date End Date Taking?  Authorizing Provider   metFORMIN (GLUCOPHAGE-XR) 500 MG extended release tablet Take 1 tablet by mouth Daily with supper for 30 doses 1/12/20 2/26/20  Echo Garner MD   diltiazem (CARDIZEM CD) 120 MG extended release capsule Take 1 capsule by mouth daily for 30 doses 1/12/20 2/26/20  Echo Garner MD   furosemide (LASIX) 20 MG tablet Take 1 tablet by mouth daily for 30 doses 1/13/20 2/26/20  Echo Garner MD   apixaban (ELIQUIS) 2.5 MG TABS tablet Take 2.5 mg by mouth 2 times daily    Historical Provider, MD   metoprolol succinate (TOPROL XL) 100 MG extended release tablet Take 100 mg by mouth 2 times daily    Historical Provider, MD   simvastatin (ZOCOR) 10 MG tablet Take 10 mg by mouth daily 1/4/18   Historical Provider, MD   Cholecalciferol (VITAMIN D3) 2000 units CAPS Take 2,000 Units by mouth daily     Historical Provider, MD         Allergies:  Pcn [penicillins] and Amiodarone    Social History:    TOBACCO:   reports that she has never smoked. She has never used smokeless tobacco.  ETOH:   reports no history of alcohol use. Family History:    Reviewed in detail and negative for DM, CAD, Cancer, CVA. Positive as follows\"  No family history on file. REVIEW OF SYSTEMS:   Pertinent positives as noted in the HPI. All other systems reviewed and negative. PHYSICAL EXAM:  BP (!) 138/59   Pulse 69   Temp 97.6 °F (36.4 °C) (Oral)   Resp 15   Ht 5' 5\" (1.651 m)   Wt 108 lb (49 kg)   SpO2 92%   BMI 17.97 kg/m²   General appearance: No apparent distress, appears stated age and cooperative. HEENT: Normal cephalic, atraumatic without obvious deformity. Pupils equal, round, and reactive to light. Extra ocular muscles intact. Conjunctivae/corneas clear. Neck: Supple, with full range of motion. No jugular venous distention. Trachea midline. Respiratory: Clear to auscultation bilaterally  Cardiovascular: Regular rate and rhythm  Abdomen: Soft, nontender, nondistended  Musculoskeletal: No clubbing, cyanosis, edema of bilateral lower extremities. Brisk capillary refill. Skin: Normal skin color. No rashes or lesions. Neurologic:  Neurovascularly intact without any focal sensory/motor deficits.  Cranial nerves: II-XII intact, grossly non-focal.  Psychiatric: Alert and oriented, thought content appropriate, normal insight    Reviewed EKG and CXR personally      CBC:   Recent Labs     01/12/22  2302   WBC 17.6*   RBC 4.08   HGB 11.7   HCT 36.1   MCV 88.5   RDW 13.3        BMP:   Recent Labs     01/12/22  2302      K 4.2   CL 95*   CO2 28   BUN 17   CREATININE 0.6 LFT:  Recent Labs     01/12/22  2302   PROT 7.0   ALKPHOS 54   ALT 16   AST 15   BILITOT 1.1     CE:  No results for input(s): Diana Mallory in the last 72 hours. PT/INR: No results for input(s): INR, APTT in the last 72 hours. BNP: No results for input(s): BNP in the last 72 hours. ESR:   Lab Results   Component Value Date    SEDRATE 21 (H) 10/15/2019     CRP:   Lab Results   Component Value Date    CRP 0.3 10/15/2019     D Dimer:   Lab Results   Component Value Date    DDIMER 372 01/12/2022      Folate and B12: No results found for: [id number], No results found for: FOLATE  Lactic Acid: No results found for: LACTA  Thyroid Studies:   Lab Results   Component Value Date    TSH 4.170 01/10/2020    O6HGUOW 6.8 01/10/2020       Oupatient labs:  Lab Results   Component Value Date    CHOL 119 01/10/2020    TRIG 75 01/10/2020    HDL 62 01/10/2020    LDLCALC 42 01/10/2020    TSH 4.170 01/10/2020    LABA1C 7.6 (H) 01/10/2020       Urinalysis:  No results found for: NITRU, WBCUA, BACTERIA, RBCUA, BLOODU, SPECGRAV, GLUCOSEU    Imaging:  XR CHEST PORTABLE    Result Date: 1/12/2022  EXAMINATION: ONE XRAY VIEW OF THE CHEST 1/12/2022 11:12 pm COMPARISON: January 9, 2020. HISTORY: ORDERING SYSTEM PROVIDED HISTORY: Shortness of breath TECHNOLOGIST PROVIDED HISTORY: Reason for exam:->Shortness of breath What reading provider will be dictating this exam?->CRC FINDINGS: There are mild patchy opacities at the lung bases, significantly improved since prior study. The heart is not enlarged. There is no pneumothorax. There is minimal blunting of the left costophrenic angle. Mild bibasilar infiltrates.        ASSESSMENT:  -COVID-19 pneumonia  -Acute hypoxic respiratory failure  -Urinary tract infection  -Leukocytosis  -Diabetes mellitus type 2  -Hypertension  -Hyperlipidemia  -Chronically anticoagulated on Eliquis      PLAN:  -Admit to medicine  -Dexamethasone 6 mg IV daily  -Vitamin C, vitamin D, zinc  -Ceftriaxone 1 g daily  -Low-dose correction insulin with Lantus at night  -Monitor serial inflammatory markers  -Continue home medications        Diet: No diet orders on file  Code Status: Prior  Surrogate decision maker confirmed with patient:   Extended Emergency Contact Information  Primary Emergency Contact: [address] Phone: [phone]  Mobile Phone: [phone]  Relation: Child  Secondary Emergency Contact: Radha Palomares   [address] Phone: [phone]  Relation: Other    DVT Prophylaxis: []Lovenox []Heparin []PCD [] [address] []Encouraged ambulation  Disposition: []Med/Surg [] Intermediate [] ICU/CCU  Admit status: [] Observation [] Inpatient     +++++++++++++++++++++++++++++++++++++++++++++++++  Guillermo Harper University HospitalDO yennifer  [address]  +++++++++++++++++++++++++++++++++++++++++++++++++  NOTE: This report was transcribed using voice recognition software. Every effort was made to ensure accuracy; however, inadvertent computerized transcription errors may be present.

## 2022-01-13 NOTE — ED NOTES
Bed: 31  Expected date:   Expected time:   Means of arrival:   Comments:  Debi Atkinson RN  01/12/22 9061

## 2022-01-14 VITALS
SYSTOLIC BLOOD PRESSURE: 146 MMHG | WEIGHT: 108 LBS | BODY MASS INDEX: 17.99 KG/M2 | RESPIRATION RATE: 18 BRPM | HEART RATE: 78 BPM | HEIGHT: 65 IN | TEMPERATURE: 97.9 F | DIASTOLIC BLOOD PRESSURE: 52 MMHG | OXYGEN SATURATION: 99 %

## 2022-01-14 LAB
ALBUMIN SERPL-MCNC: 2.8 G/DL (ref 3.5–5.2)
ALP BLD-CCNC: 51 U/L (ref 35–104)
ALT SERPL-CCNC: 12 U/L (ref 0–32)
ANION GAP SERPL CALCULATED.3IONS-SCNC: 13 MMOL/L (ref 7–16)
ANISOCYTOSIS: ABNORMAL
AST SERPL-CCNC: 13 U/L (ref 0–31)
BASOPHILS ABSOLUTE: 0 E9/L (ref 0–0.2)
BASOPHILS RELATIVE PERCENT: 0.2 % (ref 0–2)
BILIRUB SERPL-MCNC: 0.6 MG/DL (ref 0–1.2)
BUN BLDV-MCNC: 24 MG/DL (ref 6–23)
BURR CELLS: ABNORMAL
C-REACTIVE PROTEIN: 5.6 MG/DL (ref 0–0.4)
CALCIUM SERPL-MCNC: 8.9 MG/DL (ref 8.6–10.2)
CHLORIDE BLD-SCNC: 97 MMOL/L (ref 98–107)
CO2: 28 MMOL/L (ref 22–29)
CREAT SERPL-MCNC: 0.6 MG/DL (ref 0.5–1)
D DIMER: 454 NG/ML DDU
EOSINOPHILS ABSOLUTE: 0 E9/L (ref 0.05–0.5)
EOSINOPHILS RELATIVE PERCENT: 0 % (ref 0–6)
FIBRINOGEN: 676 MG/DL (ref 225–540)
GFR AFRICAN AMERICAN: >60
GFR NON-AFRICAN AMERICAN: >60 ML/MIN/1.73
GLUCOSE BLD-MCNC: 331 MG/DL (ref 74–99)
HCT VFR BLD CALC: 37.6 % (ref 34–48)
HEMOGLOBIN: 11.7 G/DL (ref 11.5–15.5)
L. PNEUMOPHILA SEROGP 1 UR AG: NORMAL
LACTIC ACID: 1.4 MMOL/L (ref 0.5–2.2)
LYMPHOCYTES ABSOLUTE: 4 E9/L (ref 1.5–4)
LYMPHOCYTES RELATIVE PERCENT: 22.6 % (ref 20–42)
MCH RBC QN AUTO: 28.3 PG (ref 26–35)
MCHC RBC AUTO-ENTMCNC: 31.1 % (ref 32–34.5)
MCV RBC AUTO: 91 FL (ref 80–99.9)
METER GLUCOSE: 318 MG/DL (ref 74–99)
METER GLUCOSE: 460 MG/DL (ref 74–99)
MONOCYTES ABSOLUTE: 1.39 E9/L (ref 0.1–0.95)
MONOCYTES RELATIVE PERCENT: 7.8 % (ref 2–12)
NEUTROPHILS ABSOLUTE: 12.18 E9/L (ref 1.8–7.3)
NEUTROPHILS RELATIVE PERCENT: 69.6 % (ref 43–80)
OVALOCYTES: ABNORMAL
PDW BLD-RTO: 13.2 FL (ref 11.5–15)
PLATELET # BLD: 235 E9/L (ref 130–450)
PMV BLD AUTO: 10.3 FL (ref 7–12)
POIKILOCYTES: ABNORMAL
POTASSIUM REFLEX MAGNESIUM: 4.1 MMOL/L (ref 3.5–5)
RBC # BLD: 4.13 E12/L (ref 3.5–5.5)
SODIUM BLD-SCNC: 138 MMOL/L (ref 132–146)
STREP PNEUMONIAE ANTIGEN, URINE: NORMAL
TOTAL PROTEIN: 6.4 G/DL (ref 6.4–8.3)
TROPONIN, HIGH SENSITIVITY: 14 NG/L (ref 0–9)
WBC # BLD: 17.4 E9/L (ref 4.5–11.5)

## 2022-01-14 PROCEDURE — 85025 COMPLETE CBC W/AUTO DIFF WBC: CPT

## 2022-01-14 PROCEDURE — 83605 ASSAY OF LACTIC ACID: CPT

## 2022-01-14 PROCEDURE — 80053 COMPREHEN METABOLIC PANEL: CPT

## 2022-01-14 PROCEDURE — 2700000000 HC OXYGEN THERAPY PER DAY

## 2022-01-14 PROCEDURE — 86140 C-REACTIVE PROTEIN: CPT

## 2022-01-14 PROCEDURE — 97535 SELF CARE MNGMENT TRAINING: CPT

## 2022-01-14 PROCEDURE — 97165 OT EVAL LOW COMPLEX 30 MIN: CPT

## 2022-01-14 PROCEDURE — 6370000000 HC RX 637 (ALT 250 FOR IP): Performed by: FAMILY MEDICINE

## 2022-01-14 PROCEDURE — 6370000000 HC RX 637 (ALT 250 FOR IP): Performed by: INTERNAL MEDICINE

## 2022-01-14 PROCEDURE — 36415 COLL VENOUS BLD VENIPUNCTURE: CPT

## 2022-01-14 PROCEDURE — 97530 THERAPEUTIC ACTIVITIES: CPT

## 2022-01-14 PROCEDURE — 6360000002 HC RX W HCPCS: Performed by: FAMILY MEDICINE

## 2022-01-14 PROCEDURE — 2580000003 HC RX 258: Performed by: FAMILY MEDICINE

## 2022-01-14 PROCEDURE — 85384 FIBRINOGEN ACTIVITY: CPT

## 2022-01-14 PROCEDURE — 97161 PT EVAL LOW COMPLEX 20 MIN: CPT

## 2022-01-14 PROCEDURE — 82962 GLUCOSE BLOOD TEST: CPT

## 2022-01-14 PROCEDURE — 84484 ASSAY OF TROPONIN QUANT: CPT

## 2022-01-14 PROCEDURE — 85378 FIBRIN DEGRADE SEMIQUANT: CPT

## 2022-01-14 RX ORDER — DEXAMETHASONE 6 MG/1
6 TABLET ORAL
Qty: 5 TABLET | Refills: 0 | Status: SHIPPED | OUTPATIENT
Start: 2022-01-14 | End: 2022-01-19

## 2022-01-14 RX ORDER — NITROFURANTOIN 25; 75 MG/1; MG/1
100 CAPSULE ORAL EVERY 12 HOURS SCHEDULED
Status: DISCONTINUED | OUTPATIENT
Start: 2022-01-14 | End: 2022-01-14 | Stop reason: HOSPADM

## 2022-01-14 RX ORDER — ASCORBIC ACID 500 MG
500 TABLET ORAL DAILY
Qty: 30 TABLET | Refills: 0 | Status: SHIPPED | OUTPATIENT
Start: 2022-01-15

## 2022-01-14 RX ORDER — ZINC SULFATE 50(220)MG
50 CAPSULE ORAL DAILY
Qty: 30 CAPSULE | Refills: 0 | COMMUNITY
Start: 2022-01-15

## 2022-01-14 RX ORDER — GUAIFENESIN/DEXTROMETHORPHAN 100-10MG/5
5 SYRUP ORAL EVERY 4 HOURS PRN
Qty: 120 ML | Refills: 0 | Status: SHIPPED | OUTPATIENT
Start: 2022-01-14 | End: 2022-01-24

## 2022-01-14 RX ORDER — DEXTROSE MONOHYDRATE 50 MG/ML
100 INJECTION, SOLUTION INTRAVENOUS PRN
Status: DISCONTINUED | OUTPATIENT
Start: 2022-01-14 | End: 2022-01-14 | Stop reason: HOSPADM

## 2022-01-14 RX ORDER — INSULIN GLARGINE 100 [IU]/ML
14 INJECTION, SOLUTION SUBCUTANEOUS NIGHTLY
Status: DISCONTINUED | OUTPATIENT
Start: 2022-01-14 | End: 2022-01-14 | Stop reason: HOSPADM

## 2022-01-14 RX ORDER — NICOTINE POLACRILEX 4 MG
15 LOZENGE BUCCAL PRN
Status: DISCONTINUED | OUTPATIENT
Start: 2022-01-14 | End: 2022-01-14 | Stop reason: HOSPADM

## 2022-01-14 RX ORDER — DEXTROSE MONOHYDRATE 25 G/50ML
12.5 INJECTION, SOLUTION INTRAVENOUS PRN
Status: DISCONTINUED | OUTPATIENT
Start: 2022-01-14 | End: 2022-01-14 | Stop reason: HOSPADM

## 2022-01-14 RX ORDER — NITROFURANTOIN 25; 75 MG/1; MG/1
100 CAPSULE ORAL EVERY 12 HOURS SCHEDULED
Qty: 20 CAPSULE | Refills: 0 | Status: SHIPPED | OUTPATIENT
Start: 2022-01-14 | End: 2022-01-24

## 2022-01-14 RX ADMIN — GUAIFENESIN AND DEXTROMETHORPHAN 5 ML: 100; 10 SYRUP ORAL at 10:04

## 2022-01-14 RX ADMIN — INSULIN LISPRO 4 UNITS: 100 INJECTION, SOLUTION INTRAVENOUS; SUBCUTANEOUS at 10:06

## 2022-01-14 RX ADMIN — FUROSEMIDE 20 MG: 20 TABLET ORAL at 10:05

## 2022-01-14 RX ADMIN — APIXABAN 2.5 MG: 2.5 TABLET, FILM COATED ORAL at 10:05

## 2022-01-14 RX ADMIN — DILTIAZEM HYDROCHLORIDE 120 MG: 120 CAPSULE, COATED, EXTENDED RELEASE ORAL at 10:06

## 2022-01-14 RX ADMIN — Medication 10 ML: at 10:04

## 2022-01-14 RX ADMIN — ATORVASTATIN CALCIUM 10 MG: 10 TABLET, FILM COATED ORAL at 10:05

## 2022-01-14 RX ADMIN — INSULIN LISPRO 4 UNITS: 100 INJECTION, SOLUTION INTRAVENOUS; SUBCUTANEOUS at 12:52

## 2022-01-14 RX ADMIN — OXYCODONE HYDROCHLORIDE AND ACETAMINOPHEN 500 MG: 500 TABLET ORAL at 10:05

## 2022-01-14 RX ADMIN — METOPROLOL SUCCINATE 100 MG: 25 TABLET, FILM COATED, EXTENDED RELEASE ORAL at 10:06

## 2022-01-14 RX ADMIN — DEXAMETHASONE SODIUM PHOSPHATE 6 MG: 10 INJECTION INTRAMUSCULAR; INTRAVENOUS at 04:31

## 2022-01-14 RX ADMIN — CEFTRIAXONE 1000 MG: 1 INJECTION, POWDER, FOR SOLUTION INTRAMUSCULAR; INTRAVENOUS at 05:55

## 2022-01-14 RX ADMIN — Medication 2000 UNITS: at 10:05

## 2022-01-14 RX ADMIN — ZINC SULFATE 220 MG (50 MG) CAPSULE 50 MG: CAPSULE at 10:05

## 2022-01-14 RX ADMIN — INSULIN LISPRO 6 UNITS: 100 INJECTION, SOLUTION INTRAVENOUS; SUBCUTANEOUS at 12:51

## 2022-01-14 ASSESSMENT — PAIN SCALES - GENERAL: PAINLEVEL_OUTOF10: 0

## 2022-01-14 NOTE — PROGRESS NOTES
Physical Therapy   Initial Assessment     Name: Octaviano Delarosa  : 1927  MRN: [id number]      Date of Service: 2022    Evaluating PT:  Jose Burton. 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 ([address] Bronchitis, Carotid stenosis, Diabetes ([address] Diabetic ulcer of left foot associated with type 2 diabetes mellitus, with necrosis of muscle ([address] DM (diabetes mellitus), type 2 ([address] Hyperlipidemia, Hypertension, Ischemic ulcer of foot, limited to breakdown of skin, left ([address] PVD (peripheral vascular disease) with claudication ([address] Stenosis of both external carotid arteries, Type 2 diabetes mellitus with left diabetic foot ulcer ([address] Ulcerated, foot, left, limited to breakdown of skin ([address] Ulcerated, foot, left, limited to breakdown of skin ([address] Ulcerated, foot, left, with fat layer exposed ([address] and Vitamin D deficiency. has a past surgical history that includes eye surgery (Bilateral); Appendectomy; Tonsillectomy; and [place] tooth extraction. Precautions:  Fall risk, droplet+, O2    SUBJECTIVE:    Pt lives alone but her children take turns providing 24-hour care in a 1 story home with 4 stairs to enter and 1 rail. Pt ambulated with a Rolator [facility] PTA. OBJECTIVE:   Initial Evaluation  Date: 22 Treatment Short Term/ Long Term   Goals   AM-PAC 6 Clicks      Was pt agreeable to Eval/treatment? Yes     Does pt have pain?  Denied     Bed Mobility  Rolling: Supervision  Supine to sit: SBA  Sit to supine: NT  Scooting: SBA  Independent   Transfers Sit to stand: SBA  Stand to sit: SBA  Stand pivot: SBA with [address] [facility] [address]  Modified Independent   Ambulation    20 feet with WW with SBA  >50 feet with WW with Modified to reduce fall risk. Transfer training: verbal cues for hand placement sit to stand transfer and assistance for anterior weight shift in order to facilitate initiation of the stand. Pt's/ family goals   1. To return home    Prognosis is good for reaching above PT goals. Patient and or family understand(s) diagnosis, prognosis, and plan of care. Yes    PHYSICAL THERAPY PLAN OF CARE:    PT POC is established based on physician order and patient diagnosis     Referring provider/PT Order:  Leann Murdock DO  Diagnosis:  Hypoxia [R09.02]  Pneumonia due to COVID-19 virus [U07.1, J12.82]  Specific instructions for next treatment:  Progress ambulation    Current Treatment Recommendations:     [x] Strengthening to improve independence with functional mobility   [] ROM to improve independence with functional mobility   [x] Balance Training to improve static/dynamic balance and to reduce fall risk  [x] Endurance Training to improve activity tolerance during functional mobility   [x] Transfer Training to improve safety and independence with all functional transfers   [x] Gait Training to improve gait mechanics, endurance and assess need for appropriate assistive device  [x] Stair Training in preparation for safe discharge home and/or into the community   [] Positioning to prevent skin breakdown and contractures  [x] Safety and Education Training   [] Patient/Caregiver Education   [] HEP  [] Other     PT long term treatment goals are located in above grid    Frequency of treatments: 2-5x/week x 1-2 weeks. Time in  1030  Time out  1050    Total Treatment Time  10 minutes     Evaluation Time includes thorough review of current medical information, gathering information on past medical history/social history and prior level of function, completion of standardized testing/informal observation of tasks, assessment of data and education on plan of care and goals.     CPT codes:  [x] Low Complexity PT evaluation 11053  [] Moderate Complexity PT evaluation 06646  [] High Complexity PT evaluation 14399  [] PT Re-evaluation 34655  [] Gait training 94517 0 minutes  [] Manual therapy 38786 0 minutes  [x] Therapeutic activities 43184 10 minutes  [] Therapeutic exercises 98048 0 minutes  [] Neuromuscular reeducation 22551 0 minutes     Julene Meckel.  [place], [place]  [id number]

## 2022-01-14 NOTE — CARE COORDINATION
Discharge order noted. Call made to patient's son, Susannah Dorsey for transition of care planning. Patient lives alone in a home, ambulates with a walker and family provides 24-7 care. Has bedside commode and walker at home and services with [facility]; care provided to patient overnight for 12 hrs a week. Uses [facility] pharmacy ([address] [place]) and PCP is Dr. Chayo Mckeon. Patient currently on 2L NC and would like referral for oxygen made to [facility]. Susannah Dorsey will transport home and requested nurse call when oxygen has been delivered. Susannah Dorsey also would like for home care services to be set-up with [facility] or any other agency that is able to accept. Ambulating pulse-ox will need to be completed; nurse informed. The Plan for Transition of Care is related to the following treatment goals: discharge planning    The Patient and/or patient representative Pauline Stain was provided with a choice of provider and agrees   with the discharge plan. [x] Yes [] No    Freedom of choice list was provided with basic dialogue that supports the patient's individualized plan of care/goals, treatment preferences and shares the quality data associated with the providers.  [x] Yes [] No    Juanita Davies, MSW,, LSW [phone]

## 2022-01-14 NOTE — PROGRESS NOTES
Hospitalist Progress Note      SYNOPSIS: Patient admitted on 2022 for Pneumonia due to COVID-19 virus  unvaxxed     SUBJECTIVE:    Patient seen and examined  Records reviewed. COVID-19 pneumonia   -Acute hypoxia now on 2L  -Urinary tract infection   -Leukocytosis -no bandemia or blasts  -Diabetes mellitus type 2   -Hypertension   -Hyperlipidemia   -Chronically anticoagulated on Eliquis    Rev of systems  GEN= denies fever  HEENT= denies HA  PULMON= cough + but improving NP  CV= denies CP  GI= denies diarrhea    Legionella and streptoccc/pneumococc ag negative  procalciton 0.37  Temp (24hrs), Av.6 °F (36.4 °C), Min:97.3 °F (36.3 °C), Max:97.9 °F (36.6 °C)    DIET: ADULT DIET;  Regular  CODE: DNR-CC    Intake/Output Summary (Last 24 hours) at 2022 1144  Last data filed at 2022 1004  Gross per 24 hour   Intake 10 ml   Output --   Net 10 ml       OBJECTIVE:2L nasal cannula 99%  MEWS 1 based on vs and rr   BP (!) 146/52   Pulse 78   Temp 97.9 °F (36.6 °C) (Oral)   Resp 18   Ht 5' 5\" (1.651 m)   Wt 108 lb (49 kg)   SpO2 99%   BMI 17.97 kg/m²     General appearance: not diaphoretic: .   Respiratory: no wheeze mild ronchi lower lobes  Cardiovascular: s1 s2 audible pulse reg     Neurologic:  Awake and alert speech clear  Mentation= displays adequate thought process and reasoning  Psych= calm cooperative attentive    ASSESSMENT:patient would like to go home today  Family would like her at home  Apparently per rn family has 24 hr care for patient aarranged    COVID-19 pneumonia bacterial  pneumonia ruled out  -Acute hypoxia now on 2L  -Urinary tract infection  ecoli  Patient received rx with iv ceftriaxone    No esbl  -Leukocytosis -no bandemia or blasts  afib  HFpEF/chf Na 138/K 4.1/Cr 0.6  PAD  -Diabetes mellitus type 2  333  -Hypertension   -Hyperlipidemia   -Chronically anticoagulated on Eliquis     PLAN:possible dc todday on home o2  DMZE 02   If patient stays continue  dexamethas /vit c/zinc for covid 19   work with Vinetta Soulier as able  incent spiromet   apixiban and diltiazem and metoprolol for afib  Furosemide and metoprolol for HFpEF  Glargine insulin + metformin for DM2  For hyperglycemia basal bolus protocol    Prandial insulin along with correction scale     insul  discon ceftriaxone     Initiate macrobid for e coli uti  If patient discharges would continue  Decadron x 5 days  DISPOSITION: home    Medications:  REVIEWED DAILY    Infusion Medications    dextrose      sodium chloride       Scheduled Medications    insulin glargine  14 Units SubCUTAneous Nightly    insulin lispro  0.08 Units/kg SubCUTAneous TID WC    apixaban  2.5 mg Oral BID    dilTIAZem  120 mg Oral Daily    furosemide  20 mg Oral Daily    metoprolol succinate  100 mg Oral BID    atorvastatin  10 mg Oral Daily    sodium chloride flush  5-40 mL IntraVENous 2 times per day    ascorbic acid  500 mg Oral Daily    zinc sulfate  50 mg Oral Daily    dexamethasone  6 mg IntraVENous Q24H    vitamin D  2,000 Units Oral Daily    insulin lispro  0-6 Units SubCUTAneous TID WC    insulin lispro  0-3 Units SubCUTAneous Nightly    cefTRIAXone (ROCEPHIN) IV  1,000 mg IntraVENous Q24H     PRN Meds: glucose, dextrose, glucagon (rDNA), dextrose, sodium chloride flush, sodium chloride, ondansetron **OR** ondansetron, polyethylene glycol, acetaminophen **OR** acetaminophen, guaiFENesin-dextromethorphan    Labs:     Recent Labs     01/12/22 2302 01/13/22 0522 01/14/22  0442   WBC 17.6* 15.9* 17.4*   HGB 11.7 10.8* 11.7   HCT 36.1 33.1* 37.6    185 235       Recent Labs     01/12/22 2302 01/13/22  0522 01/14/22  0442    132 138   K 4.2 3.9 4.1   CL 95* 93* 97*   CO2 28 26 28   BUN 17 19 24*   CREATININE 0.6 0.6 0.6   CALCIUM 9.0 8.7 8.9       Recent Labs     01/12/22 2302 01/13/22  0522 01/14/22  0442   PROT 7.0 5.9* 6.4   ALKPHOS 54 49 51   ALT 16 12 12   AST 15 14 13   BILITOT 1.1 1.0 0.6         Chronic labs:      Radiology:none today    +++++++++++++++++++++++++++++++++++++++++++++++++  DO Lynn Merchant Physician - [address] [place], [place]  +++++++++++++++++++++++++++++++++++++++++++++++++  NOTE: This report was transcribed using voice recognition software. Every effort was made to ensure accuracy; however, inadvertent computerized transcription errors may be present.

## 2022-01-14 NOTE — CARE COORDINATION
Referral for home oxygen faxed to [facility]; oxygen to be delivered to the unit. Call made to [address] [place] [address] and they do not provide skilled nursing. Referral for home care made to [facility]; start of care to begin on Monday, 1/17/22. Call made to [address], spoke with Morna Soulier and he states he will be reaching out to patient's son, Vilma Kirkland to do the home delivery and will also leave the portable tank with Vilma Kirkland to bring to the hospital when he picks up the patient.     Makenna Gonzales, MSW, LSW [phone]

## 2022-01-14 NOTE — PROGRESS NOTES
Occupational Therapy  OCCUPATIONAL THERAPY INITIAL EVALUATION    [facility] [place] Maps InDeed [address] [address]      [id number]                                                Patient Name: Luiza Casey  MRN: [id number]  : 1927  Room: [address] [place] [address] #[id number]    Referring Provider: Su Gonzalez DO  Specific Provider Orders/Date: OT eval and treat 22    Diagnosis: Hypoxia [R09.02]  Pneumonia due to COVID-19 virus [U07.1, J12.82]   Pt admitted to hospital on 22 for SOB, 86% on RA    Pertinent Medical History:  has a past medical history of Acute on chronic heart failure with preserved ejection fraction (HFpEF) ([address] 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 ([address] Bronchitis, Carotid stenosis, Diabetes ([address] Diabetic ulcer of left foot associated with type 2 diabetes mellitus, with necrosis of muscle ([address] DM (diabetes mellitus), type 2 ([address] Hyperlipidemia, Hypertension, Ischemic ulcer of foot, limited to breakdown of skin, left ([address] PVD (peripheral vascular disease) with claudication ([address] Stenosis of both external carotid arteries, Type 2 diabetes mellitus with left diabetic foot ulcer ([address] Ulcerated, foot, left, limited to breakdown of skin ([address] Ulcerated, foot, left, limited to breakdown of skin ([address] Ulcerated, foot, left, with fat layer exposed ([address] and Vitamin D deficiency.        Precautions:  Fall Risk, O2, droplet+ (covid+)    Assessment of current deficits    [x] Functional mobility  [x]ADLs  [x] Strength               []Cognition    [x] Functional transfers   [x] IADLs         [x] Safety Awareness   [x]Endurance    [] Fine Coordination              [x] Balance      [] Vision/perception   []Sensation     []Gross Motor Coordination  [] ROM  [] Delirium                   [] Motor Control     OT PLAN OF CARE   OT POC based on physician orders, patient diagnosis and results of clinical assessment    Frequency/Duration 1-3 days/wk for 2 weeks PRN   Specific OT Treatment Interventions to include:   * Instruction/training on adapted ADL techniques and AE recommendations to increase functional independence within precautions       * Training on energy conservation strategies, correct breathing pattern and techniques to improve independence/tolerance for self-care routine  * Functional transfer/mobility training/DME recommendations for increased independence, safety, and fall prevention  * Patient/Family education to increase follow through with safety techniques and functional independence  * Recommendation of environmental modifications for increased safety with functional transfers/mobility and ADLs  * Therapeutic exercise to improve motor endurance, ROM, and functional strength for ADLs/functional transfers  * Therapeutic activities to facilitate/challenge dynamic balance, stand tolerance for increased safety and independence with ADLs  * Therapeutic activities to facilitate gross/fine motor skills for increased independence with ADLs    Recommended Adaptive Equipment: TBD     Home Living: Pt lives alone (but daughter and son alternate staying with pt) in 1 floor home. 4 CIERRA, 1 handrail   Bathroom setup: tub/shower with tub transfer bench   Equipment owned: rollator    Prior Level of Function: independent/mod I with ADLs , shares IADLs; ambulated w/ rollator   Driving: no    Pain Level: Pt c/o no pain this session     Cognition: A&O: 3/4 (not to month);  Follows 1 step directions  Pleasant and cooperative   Memory:  fair +   Sequencing:  fair    Problem solving:  fair    Judgement/safety:  fair      Functional Assessment:  AM-PAC Daily Activity Raw Score: 19/24   Initial Eval Status  Date: 1/14/22 Treatment Status  Date: STGs = LTGs  Time frame: 10-14 days   Feeding Independent      Grooming Stand by Assist   Standing at sink  Modified [place] UB Dressing Stand by Assist   Modified [place]    LB Dressing Minimal Assist   Donned/doffed briefs and pants  Supervision   Bathing Minimal Assist  Supervision    Toileting Minimal Assist   Including LB clothing management and hygiene  Supervision    Bed Mobility  Rolling: SBA (for pad adjustment, skin check by RN)  Supine to sit: Stand by Assist   Sit to supine: NT  Supine to sit: Modified [place]   Sit to supine: Modified [place]    Functional Transfers EOB, chair: Stand by Assist   Low commode: CGA  Modified [place]    Functional Mobility Stand by Assist w/ w/w  EOB>commode>bedside chair  Modified [place]    Balance Sitting:     Static:  Supervision    Dynamic:SBA  Standing: SBA w/ w/w     Activity Tolerance Fair  Good   Visual/  Perceptual Glasses: readers                  Hand Dominance R   AROM (PROM) Strength Additional Info:    RUE  WFL 4/5 good  and wfl FMC/dexterity noted during ADL tasks       LUE WFL 4/5 good  and wfl FMC/dexterity noted during ADL tasks       H[facility]: [facility]   Sensation:  No c/o numbness or tingling   Tone: WFL   Edema: none noted    Comments: Upon arrival patient lying in bed. Pt agreeable to OT session this date. RN clearance. Therapist educated pt on role of OT. At end of session, patient seated in chair with call light and phone within reach, all lines and tubes intact. Overall patient demonstrated decreased independence and safety during completion of ADL/functional transfer/mobility tasks. Pt would benefit from continued skilled OT to increase safety and independence with completion of ADL/IADL tasks for functional independence and quality of life.     Treatment: OT treatment provided this date includes: Facilitation of bed mobility (education/cues for body mechanics), unsupported sitting balance (addressing posture, weight shifting, dynamic reaching to prep for ADL's), functional transfers (various surfaces w/ education/cues for safety/hand placement), standing tolerance tasks (addressing posture, balance and activity tolerance while incorporating light functional reaching impacting ADLs and functional activity) and functional ambulation tasks with w/w (including to/ from bathroom and in preparation for item retrieval tasks w/ education/cuing on posture, w/w management, energy conservation techniques and safety). Therapist facilitated self-care retraining: UB/LB self-care tasks (donned briefs and pajama pants, simulated shirt), toileting task (including hygiene) and standing grooming tasks while educating/cuing pt on modified techniques, posture, safety and energy conservation techniques. Skilled monitoring of HR, O2 sats and pts response to treatment. Vitals:  Pt on 3L O2  At rest: O2 sat=95%  Seated EOB: O2 sat=^96%  During/post ambulation (EOB>commode): O2 sat=^94%  No c/o SOB therefore decreased flow rate to 2L  Seated ADL's (on commode): O2 sat=94-96%  Post ambulation task (commode>chair): O2 sat=^95% (no c/o SOB)  End of session (seated in chair): O2 sat=96%  RN notified of flow rate and approved. Rehab Potential: Good for established goals     Patient / Family Goal: to return home      Patient and/or family were instructed on functional diagnosis, prognosis/goals and OT plan of care. Demonstrated fair understanding.      Eval Complexity: Low    Time In: 10:10  Time Out: 10:48  Total Treatment Time: 23 minutes    Min Units   OT Eval Low 97165  X  1   OT Eval Medium 07337      OT Eval High 14568      OT Re-Eval [id number]       Therapeutic Ex 21523       Therapeutic Activities 78020  8  1   ADL/Self Care 15259  15  1   Orthotic Management 05022       Manual [id number]     Neuro Re-Ed 47610       Non-Billable Time          Evaluation Time additionally includes thorough review of current medical information, gathering information on past medical history/social history and prior level of function, interpretation of standardized testing/informal observation of tasks, assessment of data and development of plan of care and goals.         Mert, OTR/L #[id number]

## 2022-01-14 NOTE — DISCHARGE SUMMARY
Hospitalist Discharge Summary    Patient ID: Cassi Guadarrama   Patient : 1927  Patient's PCP: Paris Wells MD    Admit Date: 2022   Admitting Physician: Deepak Butler DO    Discharge Date:  2022  Discharge Physician: Jonathan Smith DO   Discharge Condition: Stable  Discharge Disposition: Home  Code status DNRCCA reported by RN/stated in the chart  History of presenting illness:  Cc= weakness with hypoxia  covid 19+ unvaxed  Tested +  for covid [address] course in brief:  (Please refer to daily progress notes for a comprehensive review of the hospitalization by requesting medical records)  Remained in the hospital for several days  Received rx with iv steroids decadron and supplement 02  Her stay in the hospital was for the most part uneventful  Patient did not show worseing of her resp status  bs did elevate felt to be 2/2 decadron    Day of discharge patient appeared medically stable  bs were elevate din response to decadron  Family informed rn that patient was no longer on metformin at home due to hypoglycemia events    Patient otherwise felt well enough to dc to home  Family of patient also called in to inquire about dc / spoke with rn/care coordinat team and stated they as a family were prepared to provide 24 hr care for the patient  Arrangements were made for hhc   And home dme 02      Consults:   IP CONSULT TO HOSPITALIST    Discharge Diagnoses:  Diagnosis list of conditions  COVID-19 pneumonia bacterila pneumonia ruled out  -Acute hypoxia now on 2L  -Urinary tract infection   -Leukocytosis -no bandemia or blasts  -Diabetes mellitus type 2   -Hypertension   -Hyperlipidemia   -afib  -HFpEF  -Chronically anticoagulated on Eliquis    Discharge Instructions / Follow up:    Continued appropriate risk factor modification of blood pressure, diabetes and serum lipids will remain essential to reducing risk of future atherosclerotic development    Activity: activity as tolerated    Significant labs:  CBC:   Recent Labs     01/12/22 2302 01/13/22 0522 01/14/22 0442   WBC 17.6* 15.9* 17.4*   RBC 4.08 3.69 4.13   HGB 11.7 10.8* 11.7   HCT 36.1 33.1* 37.6   MCV 88.5 89.7 91.0   RDW 13.3 13.3 13.2    185 235     BMP:   Recent Labs     01/12/22 2302 01/13/22 0522 01/14/22 0442    132 138   K 4.2 3.9 4.1   CL 95* 93* 97*   CO2 28 26 28   BUN 17 19 24*   CREATININE 0.6 0.6 0.6     LFT:  Recent Labs     01/12/22 2302 01/13/22 0522 01/14/22 0442   PROT 7.0 5.9* 6.4   ALKPHOS 54 49 51   ALT 16 12 12   AST 15 14 13   BILITOT 1.1 1.0 0.6       Hgb A1C:   Lab Results   Component Value Date    LABA1C 9.2 (H) 01/13/2022      L. pneumophila Serogp 1 Ur Ag --    Presumptive Negative -suggesting no recent or current infections   with Legionella        STREP PNEUMONIAE ANTIGEN, URINE --     Presumptive negative- suggests no current or recent   pneumococcal          Urinalysis:      Component 1/12/20 0850   Organism Escherichia coli Abnormal     Urine Culture, Routine >100,000 CFU/ml      esbl negat  Imaging:  XR CHEST PORTABLE    Result Date: 1/12/2022  EXAMINATION: ONE XRAY VIEW OF THE CHEST 1/12/2022 11:12 pm COMPARISON: January 9, 2020. Mild bibasilar infiltrates.        Discharge Medications:      Medication List      START taking these medications    ascorbic acid 500 MG tablet  Commonly known as: VITAMIN C  Take 1 tablet by mouth daily  Start taking on: January 15, 2022     dexamethasone 6 MG tablet  Commonly known as: Decadron  Take 1 tablet by mouth daily (with breakfast) for 5 days     guaiFENesin-dextromethorphan 100-10 MG/5ML syrup  Commonly known as: ROBITUSSIN DM  Take 5 mLs by mouth every 4 hours as needed for Cough     nitrofurantoin (macrocrystal-monohydrate) 100 MG capsule  Commonly known as: Macrobid  Take 1 capsule by mouth every 12 hours for 10 days     zinc sulfate 220 (50 Zn) MG capsule  Commonly known as: ZINCATE  Take 1 capsule by mouth daily  Start taking on: January 15, 2022        CONTINUE taking these medications    apixaban 2.5 MG Tabs tablet  Commonly known as: ELIQUIS     dilTIAZem 120 MG extended release capsule  Commonly known as: CARDIZEM CD  Take 1 capsule by mouth daily for 30 doses     furosemide 20 MG tablet  Commonly known as: LASIX  Take 1 tablet by mouth daily for 30 doses     metoprolol succinate 100 MG extended release tablet  Commonly known as: TOPROL XL     simvastatin 10 MG tablet  Commonly known as: ZOCOR     Vitamin D3 50 MCG (2000 UT) Caps           Where to Get Your Medications      These medications were sent to [facility]pharmacy #[id number] [phone] [facility] [phone] [address]    Phone: [phone]   · ascorbic acid 500 MG tablet  · dexamethasone 6 MG tablet  · guaiFENesin-dextromethorphan 100-10 MG/5ML syrup  · nitrofurantoin (macrocrystal-monohydrate) 100 MG capsule     You can get these medications from any pharmacy    You don't need a prescription for these medications  · zinc sulfate 220 (50 Zn) MG capsule         Time Spent on discharge is more than 35 minutes in the examination, evaluation, counseling and review of medications and discharge plan.    +++++++++++++++++++++++++++++++++++++++++++++++++  Carlos Alberto Jeong DO  [address]  +++++++++++++++++++++++++++++++++++++++++++++++++  NOTE: This report was transcribed using voice recognition software. Every effort was made to ensure accuracy; however, inadvertent computerized transcription errors may be present.

## 2022-01-15 NOTE — PROGRESS NOTES
discharge instructions provided and reviewed with pt and sons Susannah Dorsey and Jyothi Donovan. All questions answered at this time. Pt and sons verbalize an understanding of importance of medications compliance and scheduling/attending all follow up appointments. Pt left with all belongings and sons confirmed set-up of home oxygen and brought portable tank for discharge.

## 2022-01-17 ENCOUNTER — CARE COORDINATION (OUTPATIENT)
Dept: CARE COORDINATION | Age: 87
End: 2022-01-17

## 2022-01-17 NOTE — CARE COORDINATION
Transitions of Care Call  Call within 2 business days of discharge: Yes    Patient: Tre Mabry Patient : 1927   MRN: [id number]  Reason for Admission: Covid, fatigue, weakness. Discharge Date: 22 RARS: Readmission Risk Score: 13.5 ( )      Last Discharge [facility]       Complaint Diagnosis Description Type Department Provider    22 Positive For Covid-19; Fatigue Pneumonia due to COVID-19 virus . .. ED to Hosp-Admission (Discharged) (ADMITTED) SE[facility] [address]WE [address], DO; Steven Cortez .. Was this an external facility discharge? No Discharge Facility: [facility]      Challenges to be reviewed by the provider   Additional needs identified to be addressed with provider: No  none                 Encounter was not routed to provider for escalation. Method of communication with provider: none. Discussed COVID-19 related testing which was: available at this time. Test results were: positive. Patient informed of results, if available? Yes and patient already aware. Current Symptoms: fatigue and weakness    Reviewed New or Changed Meds: yes    Do you have what you need at home?  Durable Medical Equipment ordered at discharge: None   Home Health/Outpatient orders at discharge: home health care   Was patient discharged with a pulse oximeter? Yes Discussed and confirmed pulse oximeter discharge instructions and when to notify provider or seek emergency care. Patient education provided: Reviewed appropriate site of care based on symptoms and resources available to patient including: PCP, Specialist and When to call 911. Follow up appointment scheduled within 7 days of discharge: no. If no appointment scheduled, scheduling offered: yes  No future appointments.     Interventions: Obtained and reviewed discharge summary and/or continuity of care documents  Education of patient/family/caregiver/guardian to support self-management-symptom management  Reviewed discharge instructions, medical action plan and red flags with patient who verbalized understanding. Home health to do start of care tomorrow 1/18/22. Patient has been using her Oxygen at 2 lpm.  Saturations have been between 90-95%. No further follow-up call indicated based on severity of symptoms and risk factors. Provided contact information for future needs.     Sebas Amor LPN

## 2022-01-17 NOTE — CARE COORDINATION
1/17- Received a call from patient's son, Camilla Bustillo regarding start of care for home care services. Advised start of care today could likely be cancelled today due to current weather and road conditions. Contact number for [facility] provided.     Daphne Mast, MSW, LSW [phone]

## 2023-05-17 ENCOUNTER — HOSPITAL ENCOUNTER (INPATIENT)
Age: 88
LOS: 7 days | Discharge: NURSING, REHAB OR LONG-TERM CARE | DRG: 193 | End: 2023-05-24
Attending: EMERGENCY MEDICINE | Admitting: INTERNAL MEDICINE
Payer: MEDICARE

## 2023-05-17 ENCOUNTER — APPOINTMENT (OUTPATIENT)
Dept: GENERAL RADIOLOGY | Age: 88
DRG: 193 | End: 2023-05-17
Payer: MEDICARE

## 2023-05-17 DIAGNOSIS — J18.9 PNEUMONIA DUE TO INFECTIOUS ORGANISM, UNSPECIFIED LATERALITY, UNSPECIFIED PART OF LUNG: Primary | ICD-10-CM

## 2023-05-17 DIAGNOSIS — I48.91 ATRIAL FIBRILLATION WITH RAPID VENTRICULAR RESPONSE (HCC): ICD-10-CM

## 2023-05-17 LAB
ALBUMIN SERPL-MCNC: 3.1 G/DL (ref 3.5–5.2)
ALP SERPL-CCNC: 67 U/L (ref 35–104)
ALT SERPL-CCNC: 21 U/L (ref 0–32)
ANION GAP SERPL CALCULATED.3IONS-SCNC: 13 MMOL/L (ref 7–16)
AST SERPL-CCNC: 26 U/L (ref 0–31)
B PARAP IS1001 DNA NPH QL NAA+NON-PROBE: NOT DETECTED
B PERT.PT PRMT NPH QL NAA+NON-PROBE: NOT DETECTED
BASOPHILS # BLD: 0 E9/L (ref 0–0.2)
BASOPHILS NFR BLD: 0.4 % (ref 0–2)
BILIRUB SERPL-MCNC: 0.9 MG/DL (ref 0–1.2)
BNP BLD-MCNC: 3328 PG/ML (ref 0–450)
BUN SERPL-MCNC: 15 MG/DL (ref 6–23)
C PNEUM DNA NPH QL NAA+NON-PROBE: NOT DETECTED
CALCIUM SERPL-MCNC: 8.8 MG/DL (ref 8.6–10.2)
CHLORIDE SERPL-SCNC: 89 MMOL/L (ref 98–107)
CO2 SERPL-SCNC: 26 MMOL/L (ref 22–29)
CREAT SERPL-MCNC: 0.5 MG/DL (ref 0.5–1)
EKG ATRIAL RATE: 166 BPM
EKG ATRIAL RATE: 250 BPM
EKG Q-T INTERVAL: 292 MS
EKG Q-T INTERVAL: 362 MS
EKG QRS DURATION: 100 MS
EKG QRS DURATION: 106 MS
EKG QTC CALCULATION (BAZETT): 480 MS
EKG QTC CALCULATION (BAZETT): 482 MS
EKG R AXIS: -43 DEGREES
EKG R AXIS: -44 DEGREES
EKG T AXIS: 120 DEGREES
EKG VENTRICULAR RATE: 106 BPM
EKG VENTRICULAR RATE: 164 BPM
EOSINOPHIL # BLD: 0 E9/L (ref 0.05–0.5)
EOSINOPHIL NFR BLD: 0 % (ref 0–6)
ERYTHROCYTE [DISTWIDTH] IN BLOOD BY AUTOMATED COUNT: 16.5 FL (ref 11.5–15)
FLUAV RNA NPH QL NAA+NON-PROBE: NOT DETECTED
FLUBV RNA NPH QL NAA+NON-PROBE: NOT DETECTED
GLUCOSE SERPL-MCNC: 237 MG/DL (ref 74–99)
HADV DNA NPH QL NAA+NON-PROBE: NOT DETECTED
HCOV 229E RNA NPH QL NAA+NON-PROBE: NOT DETECTED
HCOV HKU1 RNA NPH QL NAA+NON-PROBE: NOT DETECTED
HCOV NL63 RNA NPH QL NAA+NON-PROBE: NOT DETECTED
HCOV OC43 RNA NPH QL NAA+NON-PROBE: NOT DETECTED
HCT VFR BLD AUTO: 31.7 % (ref 34–48)
HGB BLD-MCNC: 9.2 G/DL (ref 11.5–15.5)
HMPV RNA NPH QL NAA+NON-PROBE: NOT DETECTED
HPIV1 RNA NPH QL NAA+NON-PROBE: NOT DETECTED
HPIV2 RNA NPH QL NAA+NON-PROBE: NOT DETECTED
HPIV3 RNA NPH QL NAA+NON-PROBE: NOT DETECTED
HPIV4 RNA NPH QL NAA+NON-PROBE: NOT DETECTED
LACTATE BLDV-SCNC: 1.6 MMOL/L (ref 0.5–1.9)
LYMPHOCYTES # BLD: 18.76 E9/L (ref 1.5–4)
LYMPHOCYTES NFR BLD: 55.7 % (ref 20–42)
M PNEUMO DNA NPH QL NAA+NON-PROBE: NOT DETECTED
MAGNESIUM SERPL-MCNC: 1.5 MG/DL (ref 1.6–2.6)
MCH RBC QN AUTO: 22.5 PG (ref 26–35)
MCHC RBC AUTO-ENTMCNC: 29 % (ref 32–34.5)
MCV RBC AUTO: 77.5 FL (ref 80–99.9)
MONOCYTES # BLD: 2.01 E9/L (ref 0.1–0.95)
MONOCYTES NFR BLD: 6.1 % (ref 2–12)
MYELOCYTES NFR BLD MANUAL: 1.7 % (ref 0–0)
NEUTROPHILS # BLD: 12.73 E9/L (ref 1.8–7.3)
NEUTS SEG NFR BLD: 36.5 % (ref 43–80)
OVALOCYTES: ABNORMAL
PLATELET # BLD AUTO: 304 E9/L (ref 130–450)
PMV BLD AUTO: 9.2 FL (ref 7–12)
POIKILOCYTES: ABNORMAL
POLYCHROMASIA: ABNORMAL
POTASSIUM SERPL-SCNC: 3.4 MMOL/L (ref 3.5–5)
PROT SERPL-MCNC: 7.1 G/DL (ref 6.4–8.3)
RBC # BLD AUTO: 4.09 E12/L (ref 3.5–5.5)
RSV RNA NPH QL NAA+NON-PROBE: NOT DETECTED
RV+EV RNA NPH QL NAA+NON-PROBE: NOT DETECTED
SARS-COV-2 RNA NPH QL NAA+NON-PROBE: NOT DETECTED
SODIUM SERPL-SCNC: 128 MMOL/L (ref 132–146)
TARGET CELLS: ABNORMAL
TROPONIN, HIGH SENSITIVITY: 23 NG/L (ref 0–9)
TROPONIN, HIGH SENSITIVITY: 25 NG/L (ref 0–9)
WBC # BLD: 33.5 E9/L (ref 4.5–11.5)

## 2023-05-17 PROCEDURE — 87040 BLOOD CULTURE FOR BACTERIA: CPT

## 2023-05-17 PROCEDURE — 83880 ASSAY OF NATRIURETIC PEPTIDE: CPT

## 2023-05-17 PROCEDURE — 96374 THER/PROPH/DIAG INJ IV PUSH: CPT

## 2023-05-17 PROCEDURE — 83605 ASSAY OF LACTIC ACID: CPT

## 2023-05-17 PROCEDURE — 6370000000 HC RX 637 (ALT 250 FOR IP): Performed by: STUDENT IN AN ORGANIZED HEALTH CARE EDUCATION/TRAINING PROGRAM

## 2023-05-17 PROCEDURE — 80053 COMPREHEN METABOLIC PANEL: CPT

## 2023-05-17 PROCEDURE — 2580000003 HC RX 258: Performed by: STUDENT IN AN ORGANIZED HEALTH CARE EDUCATION/TRAINING PROGRAM

## 2023-05-17 PROCEDURE — 6360000002 HC RX W HCPCS: Performed by: EMERGENCY MEDICINE

## 2023-05-17 PROCEDURE — 93005 ELECTROCARDIOGRAM TRACING: CPT | Performed by: STUDENT IN AN ORGANIZED HEALTH CARE EDUCATION/TRAINING PROGRAM

## 2023-05-17 PROCEDURE — 93010 ELECTROCARDIOGRAM REPORT: CPT | Performed by: INTERNAL MEDICINE

## 2023-05-17 PROCEDURE — 2060000000 HC ICU INTERMEDIATE R&B

## 2023-05-17 PROCEDURE — 83735 ASSAY OF MAGNESIUM: CPT

## 2023-05-17 PROCEDURE — 84484 ASSAY OF TROPONIN QUANT: CPT

## 2023-05-17 PROCEDURE — 96375 TX/PRO/DX INJ NEW DRUG ADDON: CPT

## 2023-05-17 PROCEDURE — 2500000003 HC RX 250 WO HCPCS: Performed by: STUDENT IN AN ORGANIZED HEALTH CARE EDUCATION/TRAINING PROGRAM

## 2023-05-17 PROCEDURE — 71045 X-RAY EXAM CHEST 1 VIEW: CPT

## 2023-05-17 PROCEDURE — 6360000002 HC RX W HCPCS: Performed by: STUDENT IN AN ORGANIZED HEALTH CARE EDUCATION/TRAINING PROGRAM

## 2023-05-17 PROCEDURE — 0202U NFCT DS 22 TRGT SARS-COV-2: CPT

## 2023-05-17 PROCEDURE — 99285 EMERGENCY DEPT VISIT HI MDM: CPT

## 2023-05-17 PROCEDURE — 85025 COMPLETE CBC W/AUTO DIFF WBC: CPT

## 2023-05-17 PROCEDURE — 6370000000 HC RX 637 (ALT 250 FOR IP): Performed by: INTERNAL MEDICINE

## 2023-05-17 PROCEDURE — 96365 THER/PROPH/DIAG IV INF INIT: CPT

## 2023-05-17 RX ORDER — CHOLECALCIFEROL (VITAMIN D3) 50 MCG
2000 TABLET ORAL DAILY
Status: DISCONTINUED | OUTPATIENT
Start: 2023-05-17 | End: 2023-05-24 | Stop reason: HOSPADM

## 2023-05-17 RX ORDER — ATORVASTATIN CALCIUM 10 MG/1
10 TABLET, FILM COATED ORAL DAILY
Status: DISCONTINUED | OUTPATIENT
Start: 2023-05-17 | End: 2023-05-24 | Stop reason: HOSPADM

## 2023-05-17 RX ORDER — FUROSEMIDE 20 MG/1
20 TABLET ORAL DAILY
Status: DISCONTINUED | OUTPATIENT
Start: 2023-05-17 | End: 2023-05-24 | Stop reason: HOSPADM

## 2023-05-17 RX ORDER — GLIMEPIRIDE 2 MG/1
2 TABLET ORAL DAILY
Status: ON HOLD | COMMUNITY
End: 2023-05-24 | Stop reason: HOSPADM

## 2023-05-17 RX ORDER — DILTIAZEM HYDROCHLORIDE 120 MG/1
120 CAPSULE, COATED, EXTENDED RELEASE ORAL DAILY
COMMUNITY

## 2023-05-17 RX ORDER — DOXYCYCLINE HYCLATE 100 MG/1
100 CAPSULE ORAL ONCE
Status: COMPLETED | OUTPATIENT
Start: 2023-05-17 | End: 2023-05-17

## 2023-05-17 RX ORDER — MAGNESIUM SULFATE IN WATER 40 MG/ML
2000 INJECTION, SOLUTION INTRAVENOUS ONCE
Status: COMPLETED | OUTPATIENT
Start: 2023-05-17 | End: 2023-05-17

## 2023-05-17 RX ORDER — METOPROLOL SUCCINATE 25 MG/1
100 TABLET, EXTENDED RELEASE ORAL 2 TIMES DAILY
Status: DISCONTINUED | OUTPATIENT
Start: 2023-05-17 | End: 2023-05-24 | Stop reason: HOSPADM

## 2023-05-17 RX ORDER — FUROSEMIDE 20 MG/1
20 TABLET ORAL DAILY
COMMUNITY

## 2023-05-17 RX ORDER — DILTIAZEM HYDROCHLORIDE 5 MG/ML
10 INJECTION INTRAVENOUS ONCE
Status: COMPLETED | OUTPATIENT
Start: 2023-05-17 | End: 2023-05-17

## 2023-05-17 RX ADMIN — APIXABAN 2.5 MG: 2.5 TABLET, FILM COATED ORAL at 19:59

## 2023-05-17 RX ADMIN — DILTIAZEM HYDROCHLORIDE 10 MG: 5 INJECTION INTRAVENOUS at 10:52

## 2023-05-17 RX ADMIN — Medication 2000 UNITS: at 20:00

## 2023-05-17 RX ADMIN — FUROSEMIDE 20 MG: 20 TABLET ORAL at 20:00

## 2023-05-17 RX ADMIN — ATORVASTATIN CALCIUM 10 MG: 10 TABLET, FILM COATED ORAL at 20:00

## 2023-05-17 RX ADMIN — METOPROLOL SUCCINATE 100 MG: 25 TABLET, EXTENDED RELEASE ORAL at 20:00

## 2023-05-17 RX ADMIN — CEFTRIAXONE SODIUM 1000 MG: 1 INJECTION, POWDER, FOR SOLUTION INTRAMUSCULAR; INTRAVENOUS at 12:31

## 2023-05-17 RX ADMIN — MAGNESIUM SULFATE HEPTAHYDRATE 2000 MG: 40 INJECTION, SOLUTION INTRAVENOUS at 11:08

## 2023-05-17 RX ADMIN — DOXYCYCLINE HYCLATE 100 MG: 100 CAPSULE ORAL at 12:30

## 2023-05-17 RX ADMIN — SODIUM CHLORIDE 5 MG/HR: 900 INJECTION, SOLUTION INTRAVENOUS at 11:35

## 2023-05-17 ASSESSMENT — PAIN - FUNCTIONAL ASSESSMENT: PAIN_FUNCTIONAL_ASSESSMENT: NONE - DENIES PAIN

## 2023-05-18 PROBLEM — I48.91 ATRIAL FIBRILLATION WITH RAPID VENTRICULAR RESPONSE (HCC): Status: ACTIVE | Noted: 2023-05-18

## 2023-05-18 LAB
ALBUMIN SERPL-MCNC: 2.5 G/DL (ref 3.5–5.2)
ALP SERPL-CCNC: 57 U/L (ref 35–104)
ALT SERPL-CCNC: 22 U/L (ref 0–32)
ANION GAP SERPL CALCULATED.3IONS-SCNC: 13 MMOL/L (ref 7–16)
AST SERPL-CCNC: 33 U/L (ref 0–31)
BILIRUB SERPL-MCNC: 0.7 MG/DL (ref 0–1.2)
BUN SERPL-MCNC: 11 MG/DL (ref 6–23)
CALCIUM SERPL-MCNC: 8.2 MG/DL (ref 8.6–10.2)
CHLORIDE SERPL-SCNC: 94 MMOL/L (ref 98–107)
CO2 SERPL-SCNC: 25 MMOL/L (ref 22–29)
CREAT SERPL-MCNC: 0.5 MG/DL (ref 0.5–1)
CRP SERPL HS-MCNC: 10.7 MG/DL (ref 0–0.4)
ERYTHROCYTE [DISTWIDTH] IN BLOOD BY AUTOMATED COUNT: 16.6 FL (ref 11.5–15)
ERYTHROCYTE [SEDIMENTATION RATE] IN BLOOD BY WESTERGREN METHOD: 50 MM/HR (ref 0–20)
FERRITIN SERPL-MCNC: 81 NG/ML
GLUCOSE SERPL-MCNC: 165 MG/DL (ref 74–99)
HBA1C MFR BLD: 7.8 % (ref 4–5.6)
HCT VFR BLD AUTO: 27.1 % (ref 34–48)
HGB BLD-MCNC: 8.1 G/DL (ref 11.5–15.5)
IRON SATN MFR SERPL: 9 % (ref 15–50)
IRON SERPL-MCNC: 26 MCG/DL (ref 37–145)
MCH RBC QN AUTO: 22.9 PG (ref 26–35)
MCHC RBC AUTO-ENTMCNC: 29.9 % (ref 32–34.5)
MCV RBC AUTO: 76.8 FL (ref 80–99.9)
PLATELET # BLD AUTO: 278 E9/L (ref 130–450)
PMV BLD AUTO: 9.3 FL (ref 7–12)
POTASSIUM SERPL-SCNC: 3.2 MMOL/L (ref 3.5–5)
PROCALCITONIN: 0.16 NG/ML (ref 0–0.08)
PROT SERPL-MCNC: 5.9 G/DL (ref 6.4–8.3)
RBC # BLD AUTO: 3.53 E12/L (ref 3.5–5.5)
SODIUM SERPL-SCNC: 132 MMOL/L (ref 132–146)
TIBC SERPL-MCNC: 284 MCG/DL (ref 250–450)
WBC # BLD: 34 E9/L (ref 4.5–11.5)

## 2023-05-18 PROCEDURE — 86140 C-REACTIVE PROTEIN: CPT

## 2023-05-18 PROCEDURE — 82728 ASSAY OF FERRITIN: CPT

## 2023-05-18 PROCEDURE — 2060000000 HC ICU INTERMEDIATE R&B

## 2023-05-18 PROCEDURE — 83036 HEMOGLOBIN GLYCOSYLATED A1C: CPT

## 2023-05-18 PROCEDURE — 2500000003 HC RX 250 WO HCPCS: Performed by: STUDENT IN AN ORGANIZED HEALTH CARE EDUCATION/TRAINING PROGRAM

## 2023-05-18 PROCEDURE — 85651 RBC SED RATE NONAUTOMATED: CPT

## 2023-05-18 PROCEDURE — 6360000002 HC RX W HCPCS: Performed by: NURSE PRACTITIONER

## 2023-05-18 PROCEDURE — 83540 ASSAY OF IRON: CPT

## 2023-05-18 PROCEDURE — 87088 URINE BACTERIA CULTURE: CPT

## 2023-05-18 PROCEDURE — 99223 1ST HOSP IP/OBS HIGH 75: CPT | Performed by: INTERNAL MEDICINE

## 2023-05-18 PROCEDURE — 2700000000 HC OXYGEN THERAPY PER DAY

## 2023-05-18 PROCEDURE — 80053 COMPREHEN METABOLIC PANEL: CPT

## 2023-05-18 PROCEDURE — 6370000000 HC RX 637 (ALT 250 FOR IP): Performed by: NURSE PRACTITIONER

## 2023-05-18 PROCEDURE — 85027 COMPLETE CBC AUTOMATED: CPT

## 2023-05-18 PROCEDURE — 2580000003 HC RX 258: Performed by: NURSE PRACTITIONER

## 2023-05-18 PROCEDURE — 87081 CULTURE SCREEN ONLY: CPT

## 2023-05-18 PROCEDURE — 84145 PROCALCITONIN (PCT): CPT

## 2023-05-18 PROCEDURE — 2580000003 HC RX 258: Performed by: STUDENT IN AN ORGANIZED HEALTH CARE EDUCATION/TRAINING PROGRAM

## 2023-05-18 PROCEDURE — 6370000000 HC RX 637 (ALT 250 FOR IP): Performed by: INTERNAL MEDICINE

## 2023-05-18 PROCEDURE — 85025 COMPLETE CBC W/AUTO DIFF WBC: CPT

## 2023-05-18 PROCEDURE — 36415 COLL VENOUS BLD VENIPUNCTURE: CPT

## 2023-05-18 PROCEDURE — 83550 IRON BINDING TEST: CPT

## 2023-05-18 RX ORDER — DOXYCYCLINE HYCLATE 100 MG/1
100 CAPSULE ORAL 2 TIMES DAILY
Status: DISCONTINUED | OUTPATIENT
Start: 2023-05-18 | End: 2023-05-24 | Stop reason: HOSPADM

## 2023-05-18 RX ORDER — DILTIAZEM HYDROCHLORIDE 120 MG/1
120 CAPSULE, COATED, EXTENDED RELEASE ORAL DAILY
Status: DISCONTINUED | OUTPATIENT
Start: 2023-05-18 | End: 2023-05-24 | Stop reason: HOSPADM

## 2023-05-18 RX ORDER — POTASSIUM CHLORIDE 20 MEQ/1
40 TABLET, EXTENDED RELEASE ORAL 2 TIMES DAILY WITH MEALS
Status: COMPLETED | OUTPATIENT
Start: 2023-05-18 | End: 2023-05-18

## 2023-05-18 RX ADMIN — ATORVASTATIN CALCIUM 10 MG: 10 TABLET, FILM COATED ORAL at 20:52

## 2023-05-18 RX ADMIN — DOXYCYCLINE HYCLATE 100 MG: 100 CAPSULE ORAL at 12:15

## 2023-05-18 RX ADMIN — DOXYCYCLINE HYCLATE 100 MG: 100 CAPSULE ORAL at 20:52

## 2023-05-18 RX ADMIN — FUROSEMIDE 20 MG: 20 TABLET ORAL at 12:15

## 2023-05-18 RX ADMIN — SODIUM CHLORIDE 12.5 MG/HR: 900 INJECTION, SOLUTION INTRAVENOUS at 00:50

## 2023-05-18 RX ADMIN — SODIUM CHLORIDE 15 MG/HR: 900 INJECTION, SOLUTION INTRAVENOUS at 01:25

## 2023-05-18 RX ADMIN — METOPROLOL SUCCINATE 100 MG: 25 TABLET, EXTENDED RELEASE ORAL at 09:12

## 2023-05-18 RX ADMIN — CEFTRIAXONE SODIUM 1000 MG: 1 INJECTION, POWDER, FOR SOLUTION INTRAMUSCULAR; INTRAVENOUS at 12:16

## 2023-05-18 RX ADMIN — APIXABAN 2.5 MG: 2.5 TABLET, FILM COATED ORAL at 09:13

## 2023-05-18 RX ADMIN — Medication 2000 UNITS: at 09:13

## 2023-05-18 RX ADMIN — METOPROLOL SUCCINATE 100 MG: 25 TABLET, EXTENDED RELEASE ORAL at 20:52

## 2023-05-18 RX ADMIN — POTASSIUM CHLORIDE 40 MEQ: 1500 TABLET, EXTENDED RELEASE ORAL at 16:25

## 2023-05-18 RX ADMIN — POTASSIUM CHLORIDE 40 MEQ: 1500 TABLET, EXTENDED RELEASE ORAL at 09:13

## 2023-05-18 RX ADMIN — DILTIAZEM HYDROCHLORIDE 120 MG: 120 CAPSULE, COATED, EXTENDED RELEASE ORAL at 12:15

## 2023-05-18 RX ADMIN — APIXABAN 2.5 MG: 2.5 TABLET, FILM COATED ORAL at 20:52

## 2023-05-19 ENCOUNTER — APPOINTMENT (OUTPATIENT)
Dept: CT IMAGING | Age: 88
DRG: 193 | End: 2023-05-19
Payer: MEDICARE

## 2023-05-19 LAB
ALBUMIN SERPL-MCNC: 2.5 G/DL (ref 3.5–5.2)
ALP SERPL-CCNC: 67 U/L (ref 35–104)
ALT SERPL-CCNC: 31 U/L (ref 0–32)
ANION GAP SERPL CALCULATED.3IONS-SCNC: 10 MMOL/L (ref 7–16)
ANISOCYTOSIS: ABNORMAL
ANISOCYTOSIS: ABNORMAL
AST SERPL-CCNC: 41 U/L (ref 0–31)
BASOPHILS # BLD: 0 E9/L (ref 0–0.2)
BASOPHILS # BLD: 0 E9/L (ref 0–0.2)
BASOPHILS NFR BLD: 0 % (ref 0–2)
BASOPHILS NFR BLD: 0.4 % (ref 0–2)
BILIRUB SERPL-MCNC: 0.7 MG/DL (ref 0–1.2)
BUN SERPL-MCNC: 11 MG/DL (ref 6–23)
CALCIUM SERPL-MCNC: 8.4 MG/DL (ref 8.6–10.2)
CHLORIDE SERPL-SCNC: 98 MMOL/L (ref 98–107)
CO2 SERPL-SCNC: 23 MMOL/L (ref 22–29)
CREAT SERPL-MCNC: 0.5 MG/DL (ref 0.5–1)
EKG ATRIAL RATE: 73 BPM
EKG P AXIS: 97 DEGREES
EKG P-R INTERVAL: 206 MS
EKG Q-T INTERVAL: 414 MS
EKG QRS DURATION: 104 MS
EKG QTC CALCULATION (BAZETT): 456 MS
EKG R AXIS: -32 DEGREES
EKG T AXIS: -17 DEGREES
EKG VENTRICULAR RATE: 73 BPM
EOSINOPHIL # BLD: 0 E9/L (ref 0.05–0.5)
EOSINOPHIL # BLD: 0 E9/L (ref 0.05–0.5)
EOSINOPHIL NFR BLD: 0 % (ref 0–6)
EOSINOPHIL NFR BLD: 0.1 % (ref 0–6)
ERYTHROCYTE [DISTWIDTH] IN BLOOD BY AUTOMATED COUNT: 16.7 FL (ref 11.5–15)
ERYTHROCYTE [DISTWIDTH] IN BLOOD BY AUTOMATED COUNT: 16.8 FL (ref 11.5–15)
ERYTHROCYTE [DISTWIDTH] IN BLOOD BY AUTOMATED COUNT: 17.1 FL (ref 11.5–15)
GLUCOSE SERPL-MCNC: 166 MG/DL (ref 74–99)
HCT VFR BLD AUTO: 28.5 % (ref 34–48)
HCT VFR BLD AUTO: 29.1 % (ref 34–48)
HCT VFR BLD AUTO: 32.2 % (ref 34–48)
HGB BLD-MCNC: 8.2 G/DL (ref 11.5–15.5)
HGB BLD-MCNC: 8.4 G/DL (ref 11.5–15.5)
HGB BLD-MCNC: 9.4 G/DL (ref 11.5–15.5)
HYPOCHROMIA: ABNORMAL
LV EF: 58 %
LVEF MODALITY: NORMAL
LYMPHOCYTES # BLD: 15.36 E9/L (ref 1.5–4)
LYMPHOCYTES # BLD: 29.01 E9/L (ref 1.5–4)
LYMPHOCYTES NFR BLD: 43.9 % (ref 20–42)
LYMPHOCYTES NFR BLD: 74 % (ref 20–42)
MCH RBC QN AUTO: 22.6 PG (ref 26–35)
MCH RBC QN AUTO: 23 PG (ref 26–35)
MCH RBC QN AUTO: 23.2 PG (ref 26–35)
MCHC RBC AUTO-ENTMCNC: 28.8 % (ref 32–34.5)
MCHC RBC AUTO-ENTMCNC: 28.9 % (ref 32–34.5)
MCHC RBC AUTO-ENTMCNC: 29.2 % (ref 32–34.5)
MCV RBC AUTO: 78.5 FL (ref 80–99.9)
MCV RBC AUTO: 79.3 FL (ref 80–99.9)
MCV RBC AUTO: 79.5 FL (ref 80–99.9)
MONOCYTES # BLD: 0.78 E9/L (ref 0.1–0.95)
MONOCYTES # BLD: 1.4 E9/L (ref 0.1–0.95)
MONOCYTES NFR BLD: 2 % (ref 2–12)
MONOCYTES NFR BLD: 4.4 % (ref 2–12)
NEUTROPHILS # BLD: 18.15 E9/L (ref 1.8–7.3)
NEUTROPHILS # BLD: 9.41 E9/L (ref 1.8–7.3)
NEUTS SEG NFR BLD: 24 % (ref 43–80)
NEUTS SEG NFR BLD: 51.7 % (ref 43–80)
OVALOCYTES: ABNORMAL
OVALOCYTES: ABNORMAL
PATHOLOGIST REVIEW: NORMAL
PLATELET # BLD AUTO: 272 E9/L (ref 130–450)
PLATELET # BLD AUTO: 304 E9/L (ref 130–450)
PLATELET # BLD AUTO: 306 E9/L (ref 130–450)
PMV BLD AUTO: 10.2 FL (ref 7–12)
PMV BLD AUTO: 10.7 FL (ref 7–12)
PMV BLD AUTO: 9.4 FL (ref 7–12)
POIKILOCYTES: ABNORMAL
POIKILOCYTES: ABNORMAL
POLYCHROMASIA: ABNORMAL
POLYCHROMASIA: ABNORMAL
POTASSIUM SERPL-SCNC: 4.4 MMOL/L (ref 3.5–5)
PROT SERPL-MCNC: 6.3 G/DL (ref 6.4–8.3)
RBC # BLD AUTO: 3.63 E12/L (ref 3.5–5.5)
RBC # BLD AUTO: 3.66 E12/L (ref 3.5–5.5)
RBC # BLD AUTO: 4.06 E12/L (ref 3.5–5.5)
SCHISTOCYTES: ABNORMAL
SICKLE CELLS: ABNORMAL
SODIUM SERPL-SCNC: 131 MMOL/L (ref 132–146)
TARGET CELLS: ABNORMAL
TEAR DROP CELLS: ABNORMAL
WBC # BLD: 34.9 E9/L (ref 4.5–11.5)
WBC # BLD: 36.9 E9/L (ref 4.5–11.5)
WBC # BLD: 39.2 E9/L (ref 4.5–11.5)

## 2023-05-19 PROCEDURE — 2060000000 HC ICU INTERMEDIATE R&B

## 2023-05-19 PROCEDURE — 97161 PT EVAL LOW COMPLEX 20 MIN: CPT

## 2023-05-19 PROCEDURE — 36415 COLL VENOUS BLD VENIPUNCTURE: CPT

## 2023-05-19 PROCEDURE — 92526 ORAL FUNCTION THERAPY: CPT

## 2023-05-19 PROCEDURE — 92610 EVALUATE SWALLOWING FUNCTION: CPT

## 2023-05-19 PROCEDURE — 6360000002 HC RX W HCPCS: Performed by: NURSE PRACTITIONER

## 2023-05-19 PROCEDURE — 6370000000 HC RX 637 (ALT 250 FOR IP): Performed by: INTERNAL MEDICINE

## 2023-05-19 PROCEDURE — 85027 COMPLETE CBC AUTOMATED: CPT

## 2023-05-19 PROCEDURE — 99233 SBSQ HOSP IP/OBS HIGH 50: CPT | Performed by: INTERNAL MEDICINE

## 2023-05-19 PROCEDURE — 2580000003 HC RX 258: Performed by: NURSE PRACTITIONER

## 2023-05-19 PROCEDURE — 80053 COMPREHEN METABOLIC PANEL: CPT

## 2023-05-19 PROCEDURE — 97165 OT EVAL LOW COMPLEX 30 MIN: CPT

## 2023-05-19 PROCEDURE — 93306 TTE W/DOPPLER COMPLETE: CPT

## 2023-05-19 PROCEDURE — 97530 THERAPEUTIC ACTIVITIES: CPT

## 2023-05-19 PROCEDURE — 85025 COMPLETE CBC W/AUTO DIFF WBC: CPT

## 2023-05-19 PROCEDURE — 6370000000 HC RX 637 (ALT 250 FOR IP): Performed by: NURSE PRACTITIONER

## 2023-05-19 PROCEDURE — 93005 ELECTROCARDIOGRAM TRACING: CPT | Performed by: NURSE PRACTITIONER

## 2023-05-19 PROCEDURE — 71250 CT THORAX DX C-: CPT

## 2023-05-19 PROCEDURE — 97535 SELF CARE MNGMENT TRAINING: CPT

## 2023-05-19 PROCEDURE — 99223 1ST HOSP IP/OBS HIGH 75: CPT | Performed by: INTERNAL MEDICINE

## 2023-05-19 PROCEDURE — 2700000000 HC OXYGEN THERAPY PER DAY

## 2023-05-19 RX ORDER — FERROUS SULFATE 325(65) MG
325 TABLET ORAL
Status: DISCONTINUED | OUTPATIENT
Start: 2023-05-20 | End: 2023-05-24 | Stop reason: HOSPADM

## 2023-05-19 RX ORDER — LEVALBUTEROL INHALATION SOLUTION 0.63 MG/3ML
0.63 SOLUTION RESPIRATORY (INHALATION) EVERY 6 HOURS
Status: DISCONTINUED | OUTPATIENT
Start: 2023-05-19 | End: 2023-05-24 | Stop reason: HOSPADM

## 2023-05-19 RX ADMIN — CEFTRIAXONE SODIUM 1000 MG: 1 INJECTION, POWDER, FOR SOLUTION INTRAMUSCULAR; INTRAVENOUS at 11:48

## 2023-05-19 RX ADMIN — APIXABAN 2.5 MG: 2.5 TABLET, FILM COATED ORAL at 11:48

## 2023-05-19 RX ADMIN — METOPROLOL SUCCINATE 100 MG: 25 TABLET, EXTENDED RELEASE ORAL at 11:47

## 2023-05-19 RX ADMIN — FUROSEMIDE 20 MG: 20 TABLET ORAL at 11:48

## 2023-05-19 RX ADMIN — DILTIAZEM HYDROCHLORIDE 120 MG: 120 CAPSULE, COATED, EXTENDED RELEASE ORAL at 11:48

## 2023-05-19 RX ADMIN — APIXABAN 2.5 MG: 2.5 TABLET, FILM COATED ORAL at 20:21

## 2023-05-19 RX ADMIN — DOXYCYCLINE HYCLATE 100 MG: 100 CAPSULE ORAL at 20:22

## 2023-05-19 RX ADMIN — METOPROLOL SUCCINATE 100 MG: 25 TABLET, EXTENDED RELEASE ORAL at 20:22

## 2023-05-19 RX ADMIN — Medication 2000 UNITS: at 11:48

## 2023-05-19 RX ADMIN — DOXYCYCLINE HYCLATE 100 MG: 100 CAPSULE ORAL at 11:48

## 2023-05-19 RX ADMIN — ATORVASTATIN CALCIUM 10 MG: 10 TABLET, FILM COATED ORAL at 20:22

## 2023-05-20 LAB
ALBUMIN SERPL-MCNC: 2.7 G/DL (ref 3.5–5.2)
ALP SERPL-CCNC: 66 U/L (ref 35–104)
ALT SERPL-CCNC: 50 U/L (ref 0–32)
ANION GAP SERPL CALCULATED.3IONS-SCNC: 12 MMOL/L (ref 7–16)
AST SERPL-CCNC: 65 U/L (ref 0–31)
BILIRUB SERPL-MCNC: 0.6 MG/DL (ref 0–1.2)
BUN SERPL-MCNC: 11 MG/DL (ref 6–23)
CALCIUM SERPL-MCNC: 8.9 MG/DL (ref 8.6–10.2)
CHLORIDE SERPL-SCNC: 94 MMOL/L (ref 98–107)
CO2 SERPL-SCNC: 27 MMOL/L (ref 22–29)
CREAT SERPL-MCNC: 0.4 MG/DL (ref 0.5–1)
ERYTHROCYTE [DISTWIDTH] IN BLOOD BY AUTOMATED COUNT: 17 FL (ref 11.5–15)
FOLATE SERPL-MCNC: 16.6 NG/ML (ref 4.8–24.2)
GLUCOSE SERPL-MCNC: 223 MG/DL (ref 74–99)
HCT VFR BLD AUTO: 30.2 % (ref 34–48)
HGB BLD-MCNC: 8.8 G/DL (ref 11.5–15.5)
LDH SERPL-CCNC: 160 U/L (ref 135–214)
MCH RBC QN AUTO: 22.6 PG (ref 26–35)
MCHC RBC AUTO-ENTMCNC: 29.1 % (ref 32–34.5)
MCV RBC AUTO: 77.4 FL (ref 80–99.9)
MRSA SPEC QL CULT: NORMAL
PLATELET # BLD AUTO: 372 E9/L (ref 130–450)
PMV BLD AUTO: 9.2 FL (ref 7–12)
POTASSIUM SERPL-SCNC: 3.8 MMOL/L (ref 3.5–5)
PROT SERPL-MCNC: 6.3 G/DL (ref 6.4–8.3)
RBC # BLD AUTO: 3.9 E12/L (ref 3.5–5.5)
SODIUM SERPL-SCNC: 133 MMOL/L (ref 132–146)
URATE SERPL-MCNC: 3.6 MG/DL (ref 2.4–5.7)
VIT B12 SERPL-MCNC: 157 PG/ML (ref 211–946)
WBC # BLD: 40.1 E9/L (ref 4.5–11.5)

## 2023-05-20 PROCEDURE — 80053 COMPREHEN METABOLIC PANEL: CPT

## 2023-05-20 PROCEDURE — 94640 AIRWAY INHALATION TREATMENT: CPT

## 2023-05-20 PROCEDURE — 6370000000 HC RX 637 (ALT 250 FOR IP): Performed by: NURSE PRACTITIONER

## 2023-05-20 PROCEDURE — 2700000000 HC OXYGEN THERAPY PER DAY

## 2023-05-20 PROCEDURE — 6360000002 HC RX W HCPCS: Performed by: INTERNAL MEDICINE

## 2023-05-20 PROCEDURE — 82607 VITAMIN B-12: CPT

## 2023-05-20 PROCEDURE — 6370000000 HC RX 637 (ALT 250 FOR IP): Performed by: INTERNAL MEDICINE

## 2023-05-20 PROCEDURE — 84550 ASSAY OF BLOOD/URIC ACID: CPT

## 2023-05-20 PROCEDURE — 6370000000 HC RX 637 (ALT 250 FOR IP)

## 2023-05-20 PROCEDURE — 2060000000 HC ICU INTERMEDIATE R&B

## 2023-05-20 PROCEDURE — 85027 COMPLETE CBC AUTOMATED: CPT

## 2023-05-20 PROCEDURE — 84630 ASSAY OF ZINC: CPT

## 2023-05-20 PROCEDURE — 84165 PROTEIN E-PHORESIS SERUM: CPT

## 2023-05-20 PROCEDURE — 82746 ASSAY OF FOLIC ACID SERUM: CPT

## 2023-05-20 PROCEDURE — 6360000002 HC RX W HCPCS: Performed by: NURSE PRACTITIONER

## 2023-05-20 PROCEDURE — 36415 COLL VENOUS BLD VENIPUNCTURE: CPT

## 2023-05-20 PROCEDURE — 94664 DEMO&/EVAL PT USE INHALER: CPT

## 2023-05-20 PROCEDURE — 2580000003 HC RX 258: Performed by: NURSE PRACTITIONER

## 2023-05-20 PROCEDURE — 99232 SBSQ HOSP IP/OBS MODERATE 35: CPT | Performed by: INTERNAL MEDICINE

## 2023-05-20 PROCEDURE — 83615 LACTATE (LD) (LDH) ENZYME: CPT

## 2023-05-20 RX ORDER — BENZONATATE 100 MG/1
100 CAPSULE ORAL 3 TIMES DAILY PRN
Status: DISCONTINUED | OUTPATIENT
Start: 2023-05-20 | End: 2023-05-24 | Stop reason: HOSPADM

## 2023-05-20 RX ORDER — CYANOCOBALAMIN 1000 UG/ML
1000 INJECTION, SOLUTION INTRAMUSCULAR; SUBCUTANEOUS DAILY
Status: COMPLETED | OUTPATIENT
Start: 2023-05-20 | End: 2023-05-22

## 2023-05-20 RX ADMIN — LEVALBUTEROL HYDROCHLORIDE 0.63 MG: 0.63 SOLUTION RESPIRATORY (INHALATION) at 16:40

## 2023-05-20 RX ADMIN — FERROUS SULFATE TAB 325 MG (65 MG ELEMENTAL FE) 325 MG: 325 (65 FE) TAB at 09:23

## 2023-05-20 RX ADMIN — APIXABAN 2.5 MG: 2.5 TABLET, FILM COATED ORAL at 19:46

## 2023-05-20 RX ADMIN — LEVALBUTEROL HYDROCHLORIDE 0.63 MG: 0.63 SOLUTION RESPIRATORY (INHALATION) at 12:41

## 2023-05-20 RX ADMIN — ATORVASTATIN CALCIUM 10 MG: 10 TABLET, FILM COATED ORAL at 19:46

## 2023-05-20 RX ADMIN — DILTIAZEM HYDROCHLORIDE 120 MG: 120 CAPSULE, COATED, EXTENDED RELEASE ORAL at 09:23

## 2023-05-20 RX ADMIN — CEFTRIAXONE SODIUM 1000 MG: 1 INJECTION, POWDER, FOR SOLUTION INTRAMUSCULAR; INTRAVENOUS at 11:48

## 2023-05-20 RX ADMIN — DOXYCYCLINE HYCLATE 100 MG: 100 CAPSULE ORAL at 19:46

## 2023-05-20 RX ADMIN — Medication 2000 UNITS: at 09:23

## 2023-05-20 RX ADMIN — CYANOCOBALAMIN 1000 MCG: 1000 INJECTION, SOLUTION INTRAMUSCULAR; SUBCUTANEOUS at 15:40

## 2023-05-20 RX ADMIN — APIXABAN 2.5 MG: 2.5 TABLET, FILM COATED ORAL at 09:25

## 2023-05-20 RX ADMIN — METOPROLOL SUCCINATE 100 MG: 25 TABLET, EXTENDED RELEASE ORAL at 09:23

## 2023-05-20 RX ADMIN — METOPROLOL SUCCINATE 100 MG: 25 TABLET, EXTENDED RELEASE ORAL at 19:47

## 2023-05-20 RX ADMIN — DOXYCYCLINE HYCLATE 100 MG: 100 CAPSULE ORAL at 09:23

## 2023-05-20 RX ADMIN — FUROSEMIDE 20 MG: 20 TABLET ORAL at 09:23

## 2023-05-20 RX ADMIN — BENZONATATE 100 MG: 100 CAPSULE ORAL at 09:25

## 2023-05-21 LAB
BACTERIA UR CULT: NORMAL
PROCALCITONIN: 0.18 NG/ML (ref 0–0.08)

## 2023-05-21 PROCEDURE — 6370000000 HC RX 637 (ALT 250 FOR IP): Performed by: NURSE PRACTITIONER

## 2023-05-21 PROCEDURE — 36415 COLL VENOUS BLD VENIPUNCTURE: CPT

## 2023-05-21 PROCEDURE — 2060000000 HC ICU INTERMEDIATE R&B

## 2023-05-21 PROCEDURE — 6360000002 HC RX W HCPCS: Performed by: INTERNAL MEDICINE

## 2023-05-21 PROCEDURE — 6360000002 HC RX W HCPCS: Performed by: NURSE PRACTITIONER

## 2023-05-21 PROCEDURE — 84145 PROCALCITONIN (PCT): CPT

## 2023-05-21 PROCEDURE — 6370000000 HC RX 637 (ALT 250 FOR IP): Performed by: INTERNAL MEDICINE

## 2023-05-21 PROCEDURE — 94669 MECHANICAL CHEST WALL OSCILL: CPT

## 2023-05-21 PROCEDURE — 2580000003 HC RX 258: Performed by: NURSE PRACTITIONER

## 2023-05-21 PROCEDURE — 94640 AIRWAY INHALATION TREATMENT: CPT

## 2023-05-21 PROCEDURE — 99232 SBSQ HOSP IP/OBS MODERATE 35: CPT | Performed by: INTERNAL MEDICINE

## 2023-05-21 PROCEDURE — 2700000000 HC OXYGEN THERAPY PER DAY

## 2023-05-21 RX ORDER — ACETAMINOPHEN 325 MG/1
650 TABLET ORAL EVERY 6 HOURS PRN
Status: DISCONTINUED | OUTPATIENT
Start: 2023-05-21 | End: 2023-05-24 | Stop reason: HOSPADM

## 2023-05-21 RX ADMIN — METOPROLOL SUCCINATE 100 MG: 25 TABLET, EXTENDED RELEASE ORAL at 08:50

## 2023-05-21 RX ADMIN — CYANOCOBALAMIN 1000 MCG: 1000 INJECTION, SOLUTION INTRAMUSCULAR; SUBCUTANEOUS at 08:51

## 2023-05-21 RX ADMIN — ATORVASTATIN CALCIUM 10 MG: 10 TABLET, FILM COATED ORAL at 20:11

## 2023-05-21 RX ADMIN — CEFTRIAXONE SODIUM 1000 MG: 1 INJECTION, POWDER, FOR SOLUTION INTRAMUSCULAR; INTRAVENOUS at 12:03

## 2023-05-21 RX ADMIN — DOXYCYCLINE HYCLATE 100 MG: 100 CAPSULE ORAL at 08:50

## 2023-05-21 RX ADMIN — DOXYCYCLINE HYCLATE 100 MG: 100 CAPSULE ORAL at 20:14

## 2023-05-21 RX ADMIN — FERROUS SULFATE TAB 325 MG (65 MG ELEMENTAL FE) 325 MG: 325 (65 FE) TAB at 08:51

## 2023-05-21 RX ADMIN — LEVALBUTEROL HYDROCHLORIDE 0.63 MG: 0.63 SOLUTION RESPIRATORY (INHALATION) at 11:45

## 2023-05-21 RX ADMIN — METOPROLOL SUCCINATE 100 MG: 25 TABLET, EXTENDED RELEASE ORAL at 20:11

## 2023-05-21 RX ADMIN — DILTIAZEM HYDROCHLORIDE 120 MG: 120 CAPSULE, COATED, EXTENDED RELEASE ORAL at 08:56

## 2023-05-21 RX ADMIN — FUROSEMIDE 20 MG: 20 TABLET ORAL at 08:50

## 2023-05-21 RX ADMIN — APIXABAN 2.5 MG: 2.5 TABLET, FILM COATED ORAL at 08:51

## 2023-05-21 RX ADMIN — LEVALBUTEROL HYDROCHLORIDE 0.63 MG: 0.63 SOLUTION RESPIRATORY (INHALATION) at 20:55

## 2023-05-21 RX ADMIN — APIXABAN 2.5 MG: 2.5 TABLET, FILM COATED ORAL at 20:11

## 2023-05-21 RX ADMIN — Medication 2000 UNITS: at 08:50

## 2023-05-22 ENCOUNTER — APPOINTMENT (OUTPATIENT)
Dept: GENERAL RADIOLOGY | Age: 88
DRG: 193 | End: 2023-05-22
Payer: MEDICARE

## 2023-05-22 LAB
ALBUMIN SERPL-MCNC: 2 G/DL (ref 3.5–4.7)
ALBUMIN SERPL-MCNC: 2.3 G/DL (ref 3.5–5.2)
ALP SERPL-CCNC: 70 U/L (ref 35–104)
ALPHA1 GLOB SERPL ELPH-MCNC: 0.4 G/DL (ref 0.2–0.4)
ALPHA2 GLOB SERPL ELPH-MCNC: 1 G/DL (ref 0.5–1)
ALT SERPL-CCNC: 83 U/L (ref 0–32)
ANION GAP SERPL CALCULATED.3IONS-SCNC: 8 MMOL/L (ref 7–16)
ANISOCYTOSIS: ABNORMAL
AST SERPL-CCNC: 84 U/L (ref 0–31)
B-GLOBULIN SERPL ELPH-MCNC: 1.1 G/DL (ref 0.8–1.3)
BACTERIA BLD CULT ORG #2: NORMAL
BACTERIA BLD CULT: NORMAL
BASOPHILS # BLD: 0 E9/L (ref 0–0.2)
BASOPHILS NFR BLD: 0 % (ref 0–2)
BILIRUB SERPL-MCNC: 0.3 MG/DL (ref 0–1.2)
BUN SERPL-MCNC: 13 MG/DL (ref 6–23)
BURR CELLS: ABNORMAL
CALCIUM SERPL-MCNC: 8.7 MG/DL (ref 8.6–10.2)
CHLORIDE SERPL-SCNC: 96 MMOL/L (ref 98–107)
CO2 SERPL-SCNC: 32 MMOL/L (ref 22–29)
CREAT SERPL-MCNC: 0.5 MG/DL (ref 0.5–1)
ELECTROPHORESIS: ABNORMAL
EOSINOPHIL # BLD: 0.6 E9/L (ref 0.05–0.5)
EOSINOPHIL NFR BLD: 2 % (ref 0–6)
ERYTHROCYTE [DISTWIDTH] IN BLOOD BY AUTOMATED COUNT: 17.2 FL (ref 11.5–15)
GAMMA GLOB SERPL ELPH-MCNC: 1.2 G/DL (ref 0.7–1.6)
GLUCOSE SERPL-MCNC: 181 MG/DL (ref 74–99)
HCT VFR BLD AUTO: 29.6 % (ref 34–48)
HGB BLD-MCNC: 8.5 G/DL (ref 11.5–15.5)
HYPOCHROMIA: ABNORMAL
LYMPHOCYTES # BLD: 22.95 E9/L (ref 1.5–4)
LYMPHOCYTES NFR BLD: 77 % (ref 20–42)
MCH RBC QN AUTO: 23 PG (ref 26–35)
MCHC RBC AUTO-ENTMCNC: 28.7 % (ref 32–34.5)
MCV RBC AUTO: 80 FL (ref 80–99.9)
MONOCYTES # BLD: 0.89 E9/L (ref 0.1–0.95)
MONOCYTES NFR BLD: 3 % (ref 2–12)
NEUTROPHILS # BLD: 5.36 E9/L (ref 1.8–7.3)
NEUTS SEG NFR BLD: 18 % (ref 43–80)
OVALOCYTES: ABNORMAL
PLATELET # BLD AUTO: 331 E9/L (ref 130–450)
PMV BLD AUTO: 9.3 FL (ref 7–12)
POIKILOCYTES: ABNORMAL
POLYCHROMASIA: ABNORMAL
POTASSIUM SERPL-SCNC: 3.9 MMOL/L (ref 3.5–5)
PROT SERPL-MCNC: 5.7 G/DL (ref 6.4–8.3)
RBC # BLD AUTO: 3.7 E12/L (ref 3.5–5.5)
SMUDGE CELLS: ABNORMAL
SODIUM SERPL-SCNC: 136 MMOL/L (ref 132–146)
TARGET CELLS: ABNORMAL
WBC # BLD: 29.8 E9/L (ref 4.5–11.5)

## 2023-05-22 PROCEDURE — 6370000000 HC RX 637 (ALT 250 FOR IP)

## 2023-05-22 PROCEDURE — 6370000000 HC RX 637 (ALT 250 FOR IP): Performed by: INTERNAL MEDICINE

## 2023-05-22 PROCEDURE — 6370000000 HC RX 637 (ALT 250 FOR IP): Performed by: NURSE PRACTITIONER

## 2023-05-22 PROCEDURE — 2700000000 HC OXYGEN THERAPY PER DAY

## 2023-05-22 PROCEDURE — 99232 SBSQ HOSP IP/OBS MODERATE 35: CPT | Performed by: INTERNAL MEDICINE

## 2023-05-22 PROCEDURE — 2580000003 HC RX 258: Performed by: NURSE PRACTITIONER

## 2023-05-22 PROCEDURE — 94640 AIRWAY INHALATION TREATMENT: CPT

## 2023-05-22 PROCEDURE — 6360000002 HC RX W HCPCS: Performed by: INTERNAL MEDICINE

## 2023-05-22 PROCEDURE — 36415 COLL VENOUS BLD VENIPUNCTURE: CPT

## 2023-05-22 PROCEDURE — 71045 X-RAY EXAM CHEST 1 VIEW: CPT

## 2023-05-22 PROCEDURE — 80053 COMPREHEN METABOLIC PANEL: CPT

## 2023-05-22 PROCEDURE — 94669 MECHANICAL CHEST WALL OSCILL: CPT

## 2023-05-22 PROCEDURE — 97535 SELF CARE MNGMENT TRAINING: CPT

## 2023-05-22 PROCEDURE — 2060000000 HC ICU INTERMEDIATE R&B

## 2023-05-22 PROCEDURE — 85025 COMPLETE CBC W/AUTO DIFF WBC: CPT

## 2023-05-22 PROCEDURE — 6360000002 HC RX W HCPCS: Performed by: NURSE PRACTITIONER

## 2023-05-22 PROCEDURE — 97530 THERAPEUTIC ACTIVITIES: CPT

## 2023-05-22 RX ADMIN — CYANOCOBALAMIN 1000 MCG: 1000 INJECTION, SOLUTION INTRAMUSCULAR; SUBCUTANEOUS at 07:49

## 2023-05-22 RX ADMIN — APIXABAN 2.5 MG: 2.5 TABLET, FILM COATED ORAL at 21:02

## 2023-05-22 RX ADMIN — APIXABAN 2.5 MG: 2.5 TABLET, FILM COATED ORAL at 07:49

## 2023-05-22 RX ADMIN — DILTIAZEM HYDROCHLORIDE 120 MG: 120 CAPSULE, COATED, EXTENDED RELEASE ORAL at 07:48

## 2023-05-22 RX ADMIN — ATORVASTATIN CALCIUM 10 MG: 10 TABLET, FILM COATED ORAL at 21:10

## 2023-05-22 RX ADMIN — DOXYCYCLINE HYCLATE 100 MG: 100 CAPSULE ORAL at 21:02

## 2023-05-22 RX ADMIN — LEVALBUTEROL HYDROCHLORIDE 0.63 MG: 0.63 SOLUTION RESPIRATORY (INHALATION) at 08:14

## 2023-05-22 RX ADMIN — CEFTRIAXONE SODIUM 1000 MG: 1 INJECTION, POWDER, FOR SOLUTION INTRAMUSCULAR; INTRAVENOUS at 11:27

## 2023-05-22 RX ADMIN — FERROUS SULFATE TAB 325 MG (65 MG ELEMENTAL FE) 325 MG: 325 (65 FE) TAB at 07:48

## 2023-05-22 RX ADMIN — Medication 2000 UNITS: at 07:49

## 2023-05-22 RX ADMIN — METOPROLOL SUCCINATE 100 MG: 25 TABLET, EXTENDED RELEASE ORAL at 07:48

## 2023-05-22 RX ADMIN — FUROSEMIDE 20 MG: 20 TABLET ORAL at 07:48

## 2023-05-22 RX ADMIN — BENZONATATE 100 MG: 100 CAPSULE ORAL at 21:02

## 2023-05-22 RX ADMIN — METOPROLOL SUCCINATE 100 MG: 25 TABLET, EXTENDED RELEASE ORAL at 21:02

## 2023-05-22 RX ADMIN — DOXYCYCLINE HYCLATE 100 MG: 100 CAPSULE ORAL at 07:48

## 2023-05-22 RX ADMIN — LEVALBUTEROL HYDROCHLORIDE 0.63 MG: 0.63 SOLUTION RESPIRATORY (INHALATION) at 12:26

## 2023-05-22 RX ADMIN — ACETAMINOPHEN 650 MG: 325 TABLET ORAL at 22:17

## 2023-05-22 RX ADMIN — LEVALBUTEROL HYDROCHLORIDE 0.63 MG: 0.63 SOLUTION RESPIRATORY (INHALATION) at 20:32

## 2023-05-22 ASSESSMENT — PAIN DESCRIPTION - LOCATION: LOCATION: FOOT

## 2023-05-22 ASSESSMENT — PAIN SCALES - GENERAL
PAINLEVEL_OUTOF10: 0

## 2023-05-22 ASSESSMENT — PAIN DESCRIPTION - ORIENTATION: ORIENTATION: RIGHT;LEFT

## 2023-05-22 NOTE — PLAN OF CARE
Problem: Discharge Planning  Goal: Discharge to home or other facility with appropriate resources  Outcome: Progressing  Flowsheets (Taken 5/22/2023 0800)  Discharge to home or other facility with appropriate resources: Identify barriers to discharge with patient and caregiver     Problem: Safety - Adult  Goal: Free from fall injury  Outcome: Progressing  Flowsheets (Taken 5/22/2023 0805)  Free From Fall Injury: Instruct family/caregiver on patient safety     Problem: Cardiovascular - Adult  Goal: Maintains optimal cardiac output and hemodynamic stability  Outcome: Progressing  Flowsheets (Taken 5/22/2023 0800)  Maintains optimal cardiac output and hemodynamic stability: Monitor blood pressure and heart rate     Problem: Metabolic/Fluid and Electrolytes - Adult  Goal: Hemodynamic stability and optimal renal function maintained  Outcome: Progressing  Flowsheets (Taken 5/22/2023 0800)  Hemodynamic stability and optimal renal function maintained: Monitor labs and assess for signs and symptoms of volume excess or deficit     Problem: Chronic Conditions and Co-morbidities  Goal: Patient's chronic conditions and co-morbidity symptoms are monitored and maintained or improved  Outcome: Progressing  Flowsheets (Taken 5/22/2023 0800)  Care Plan - Patient's Chronic Conditions and Co-Morbidity Symptoms are Monitored and Maintained or Improved: Monitor and assess patient's chronic conditions and comorbid symptoms for stability, deterioration, or improvement     Problem: Skin/Tissue Integrity  Goal: Absence of new skin breakdown  Description: 1. Monitor for areas of redness and/or skin breakdown  2. Assess vascular access sites hourly  3. Every 4-6 hours minimum:  Change oxygen saturation probe site  4. Every 4-6 hours:  If on nasal continuous positive airway pressure, respiratory therapy assess nares and determine need for appliance change or resting period.   Outcome: Progressing     Problem: ABCDS Injury Assessment  Goal:

## 2023-05-23 ENCOUNTER — APPOINTMENT (OUTPATIENT)
Dept: ULTRASOUND IMAGING | Age: 88
DRG: 193 | End: 2023-05-23
Payer: MEDICARE

## 2023-05-23 LAB — ZINC SERPL-MCNC: 48.2 UG/DL (ref 60–120)

## 2023-05-23 PROCEDURE — 94640 AIRWAY INHALATION TREATMENT: CPT

## 2023-05-23 PROCEDURE — 6370000000 HC RX 637 (ALT 250 FOR IP): Performed by: NURSE PRACTITIONER

## 2023-05-23 PROCEDURE — 76999 ECHO EXAMINATION PROCEDURE: CPT

## 2023-05-23 PROCEDURE — 2580000003 HC RX 258: Performed by: NURSE PRACTITIONER

## 2023-05-23 PROCEDURE — 6360000002 HC RX W HCPCS: Performed by: NURSE PRACTITIONER

## 2023-05-23 PROCEDURE — 6370000000 HC RX 637 (ALT 250 FOR IP)

## 2023-05-23 PROCEDURE — 97535 SELF CARE MNGMENT TRAINING: CPT

## 2023-05-23 PROCEDURE — 6370000000 HC RX 637 (ALT 250 FOR IP): Performed by: INTERNAL MEDICINE

## 2023-05-23 PROCEDURE — 2060000000 HC ICU INTERMEDIATE R&B

## 2023-05-23 PROCEDURE — 6360000002 HC RX W HCPCS: Performed by: INTERNAL MEDICINE

## 2023-05-23 PROCEDURE — 97530 THERAPEUTIC ACTIVITIES: CPT

## 2023-05-23 PROCEDURE — 94669 MECHANICAL CHEST WALL OSCILL: CPT

## 2023-05-23 PROCEDURE — 99232 SBSQ HOSP IP/OBS MODERATE 35: CPT | Performed by: INTERNAL MEDICINE

## 2023-05-23 RX ORDER — CYANOCOBALAMIN 1000 UG/ML
1000 INJECTION, SOLUTION INTRAMUSCULAR; SUBCUTANEOUS DAILY
Status: DISCONTINUED | OUTPATIENT
Start: 2023-05-23 | End: 2023-05-24 | Stop reason: HOSPADM

## 2023-05-23 RX ORDER — GUAIFENESIN/DEXTROMETHORPHAN 100-10MG/5
5 SYRUP ORAL EVERY 4 HOURS PRN
Status: DISCONTINUED | OUTPATIENT
Start: 2023-05-23 | End: 2023-05-24 | Stop reason: HOSPADM

## 2023-05-23 RX ORDER — ZINC SULFATE 50(220)MG
50 CAPSULE ORAL DAILY
Status: DISCONTINUED | OUTPATIENT
Start: 2023-05-23 | End: 2023-05-24 | Stop reason: HOSPADM

## 2023-05-23 RX ADMIN — FUROSEMIDE 20 MG: 20 TABLET ORAL at 07:24

## 2023-05-23 RX ADMIN — Medication 50 MG: at 20:27

## 2023-05-23 RX ADMIN — LEVALBUTEROL HYDROCHLORIDE 0.63 MG: 0.63 SOLUTION RESPIRATORY (INHALATION) at 20:37

## 2023-05-23 RX ADMIN — BENZONATATE 100 MG: 100 CAPSULE ORAL at 11:03

## 2023-05-23 RX ADMIN — BENZONATATE 100 MG: 100 CAPSULE ORAL at 05:32

## 2023-05-23 RX ADMIN — METOPROLOL SUCCINATE 100 MG: 25 TABLET, EXTENDED RELEASE ORAL at 20:58

## 2023-05-23 RX ADMIN — Medication 2000 UNITS: at 07:24

## 2023-05-23 RX ADMIN — DOXYCYCLINE HYCLATE 100 MG: 100 CAPSULE ORAL at 20:28

## 2023-05-23 RX ADMIN — APIXABAN 2.5 MG: 2.5 TABLET, FILM COATED ORAL at 07:24

## 2023-05-23 RX ADMIN — BENZONATATE 100 MG: 100 CAPSULE ORAL at 20:27

## 2023-05-23 RX ADMIN — GUAIFENESIN SYRUP AND DEXTROMETHORPHAN 5 ML: 100; 10 SYRUP ORAL at 19:01

## 2023-05-23 RX ADMIN — DOXYCYCLINE HYCLATE 100 MG: 100 CAPSULE ORAL at 07:24

## 2023-05-23 RX ADMIN — METOPROLOL SUCCINATE 100 MG: 25 TABLET, EXTENDED RELEASE ORAL at 07:24

## 2023-05-23 RX ADMIN — DILTIAZEM HYDROCHLORIDE 120 MG: 120 CAPSULE, COATED, EXTENDED RELEASE ORAL at 07:24

## 2023-05-23 RX ADMIN — LEVALBUTEROL HYDROCHLORIDE 0.63 MG: 0.63 SOLUTION RESPIRATORY (INHALATION) at 12:17

## 2023-05-23 RX ADMIN — ATORVASTATIN CALCIUM 10 MG: 10 TABLET, FILM COATED ORAL at 20:28

## 2023-05-23 RX ADMIN — APIXABAN 2.5 MG: 2.5 TABLET, FILM COATED ORAL at 20:30

## 2023-05-23 RX ADMIN — FERROUS SULFATE TAB 325 MG (65 MG ELEMENTAL FE) 325 MG: 325 (65 FE) TAB at 07:24

## 2023-05-23 RX ADMIN — CEFTRIAXONE SODIUM 1000 MG: 1 INJECTION, POWDER, FOR SOLUTION INTRAMUSCULAR; INTRAVENOUS at 11:03

## 2023-05-23 RX ADMIN — GUAIFENESIN SYRUP AND DEXTROMETHORPHAN 5 ML: 100; 10 SYRUP ORAL at 13:15

## 2023-05-23 ASSESSMENT — PAIN SCALES - GENERAL
PAINLEVEL_OUTOF10: 0

## 2023-05-23 NOTE — PLAN OF CARE
Problem: Discharge Planning  Goal: Discharge to home or other facility with appropriate resources  5/23/2023 1201 by Yoly Villa RN  Outcome: Progressing  Flowsheets (Taken 5/23/2023 0705)  Discharge to home or other facility with appropriate resources: Identify barriers to discharge with patient and caregiver  5/23/2023 0637 by Jaimie Wynne RN  Outcome: Progressing  Flowsheets (Taken 5/22/2023 2022)  Discharge to home or other facility with appropriate resources: Identify barriers to discharge with patient and caregiver     Problem: Safety - Adult  Goal: Free from fall injury  5/23/2023 1201 by Yoly Villa RN  Outcome: Progressing  Flowsheets (Taken 5/23/2023 0827)  Free From Fall Injury: Instruct family/caregiver on patient safety  5/23/2023 0637 by Jaimie Wynne RN  Outcome: Progressing  Flowsheets (Taken 5/22/2023 2245)  Free From Fall Injury: Instruct family/caregiver on patient safety     Problem: Cardiovascular - Adult  Goal: Maintains optimal cardiac output and hemodynamic stability  5/23/2023 1201 by Yoly Villa RN  Outcome: Progressing  5/23/2023 0637 by Jaimie Wynne RN  Outcome: Progressing     Problem: Metabolic/Fluid and Electrolytes - Adult  Goal: Hemodynamic stability and optimal renal function maintained  5/23/2023 1201 by Yoly Villa RN  Outcome: Progressing  Flowsheets (Taken 5/23/2023 0705)  Hemodynamic stability and optimal renal function maintained: Monitor labs and assess for signs and symptoms of volume excess or deficit  5/23/2023 0637 by Jaimie Wynne RN  Outcome: Progressing     Problem: Chronic Conditions and Co-morbidities  Goal: Patient's chronic conditions and co-morbidity symptoms are monitored and maintained or improved  5/23/2023 1201 by Yoly Villa RN  Outcome: Progressing  Flowsheets (Taken 5/23/2023 0705)  Care Plan - Patient's Chronic Conditions and Co-Morbidity Symptoms are Monitored and Maintained or Improved: Monitor and assess patient's chronic conditions and

## 2023-05-23 NOTE — PLAN OF CARE
Problem: Discharge Planning  Goal: Discharge to home or other facility with appropriate resources  Outcome: Progressing  Flowsheets (Taken 5/22/2023 2022)  Discharge to home or other facility with appropriate resources: Identify barriers to discharge with patient and caregiver     Problem: Safety - Adult  Goal: Free from fall injury  Outcome: Progressing  Flowsheets (Taken 5/22/2023 2245)  Free From Fall Injury: Instruct family/caregiver on patient safety     Problem: Cardiovascular - Adult  Goal: Maintains optimal cardiac output and hemodynamic stability  Outcome: Progressing     Problem: Metabolic/Fluid and Electrolytes - Adult  Goal: Hemodynamic stability and optimal renal function maintained  Outcome: Progressing     Problem: Chronic Conditions and Co-morbidities  Goal: Patient's chronic conditions and co-morbidity symptoms are monitored and maintained or improved  Outcome: Progressing  Flowsheets (Taken 5/22/2023 2022)  Care Plan - Patient's Chronic Conditions and Co-Morbidity Symptoms are Monitored and Maintained or Improved:   Monitor and assess patient's chronic conditions and comorbid symptoms for stability, deterioration, or improvement   Collaborate with multidisciplinary team to address chronic and comorbid conditions and prevent exacerbation or deterioration   Update acute care plan with appropriate goals if chronic or comorbid symptoms are exacerbated and prevent overall improvement and discharge     Problem: Skin/Tissue Integrity  Goal: Absence of new skin breakdown  Description: 1. Monitor for areas of redness and/or skin breakdown  2. Assess vascular access sites hourly  3. Every 4-6 hours minimum:  Change oxygen saturation probe site  4. Every 4-6 hours:  If on nasal continuous positive airway pressure, respiratory therapy assess nares and determine need for appliance change or resting period.   Outcome: Progressing     Problem: ABCDS Injury Assessment  Goal: Absence of physical

## 2023-05-23 NOTE — CARE COORDINATION
5/23/23 Update CM Note. Patient admitted 5/17/23 Pneumonia due to organism and atrial fibrillation with rapid ventricular response. Remains on IV Rocephin. Likely dc tomorrow per PCP. SNF choices provided by son-1st) [facility]-declined,  2nd) [facility]-referral sent,  3rd) [facility]-referral sent. Sarita VINES RN-BC  [phone]    Addendum: Patient accepted at [facility].

## 2023-05-24 VITALS
OXYGEN SATURATION: 95 % | TEMPERATURE: 96.8 F | HEART RATE: 64 BPM | RESPIRATION RATE: 20 BRPM | BODY MASS INDEX: 18.7 KG/M2 | WEIGHT: 112.25 LBS | SYSTOLIC BLOOD PRESSURE: 158 MMHG | DIASTOLIC BLOOD PRESSURE: 68 MMHG | HEIGHT: 65 IN

## 2023-05-24 LAB
ALBUMIN SERPL-MCNC: 2.4 G/DL (ref 3.5–5.2)
ALP SERPL-CCNC: 66 U/L (ref 35–104)
ALT SERPL-CCNC: 43 U/L (ref 0–32)
ANION GAP SERPL CALCULATED.3IONS-SCNC: 8 MMOL/L (ref 7–16)
AST SERPL-CCNC: 25 U/L (ref 0–31)
BASOPHILS # BLD: 0.12 E9/L (ref 0–0.2)
BASOPHILS NFR BLD: 0.4 % (ref 0–2)
BILIRUB SERPL-MCNC: 0.3 MG/DL (ref 0–1.2)
BUN SERPL-MCNC: 14 MG/DL (ref 6–23)
CALCIUM SERPL-MCNC: 8.7 MG/DL (ref 8.6–10.2)
CHLORIDE SERPL-SCNC: 93 MMOL/L (ref 98–107)
CO2 SERPL-SCNC: 32 MMOL/L (ref 22–29)
CREAT SERPL-MCNC: 0.5 MG/DL (ref 0.5–1)
EOSINOPHIL # BLD: 0.04 E9/L (ref 0.05–0.5)
EOSINOPHIL NFR BLD: 0.1 % (ref 0–6)
ERYTHROCYTE [DISTWIDTH] IN BLOOD BY AUTOMATED COUNT: 17.6 FL (ref 11.5–15)
GLUCOSE SERPL-MCNC: 236 MG/DL (ref 74–99)
HCT VFR BLD AUTO: 31.8 % (ref 34–48)
HGB BLD-MCNC: 9.2 G/DL (ref 11.5–15.5)
IMM GRANULOCYTES # BLD: 0.1 E9/L
IMM GRANULOCYTES NFR BLD: 0.3 % (ref 0–5)
LYMPHOCYTES # BLD: 20.92 E9/L (ref 1.5–4)
LYMPHOCYTES NFR BLD: 70.9 % (ref 20–42)
Lab: NORMAL
MCH RBC QN AUTO: 22.8 PG (ref 26–35)
MCHC RBC AUTO-ENTMCNC: 28.9 % (ref 32–34.5)
MCV RBC AUTO: 78.7 FL (ref 80–99.9)
MONOCYTES # BLD: 0.87 E9/L (ref 0.1–0.95)
MONOCYTES NFR BLD: 3 % (ref 2–12)
NEUTROPHILS # BLD: 7.44 E9/L (ref 1.8–7.3)
NEUTS SEG NFR BLD: 25.3 % (ref 43–80)
PLATELET # BLD AUTO: 342 E9/L (ref 130–450)
PMV BLD AUTO: 9.1 FL (ref 7–12)
POTASSIUM SERPL-SCNC: 3.9 MMOL/L (ref 3.5–5)
PROT SERPL-MCNC: 6.1 G/DL (ref 6.4–8.3)
RBC # BLD AUTO: 4.04 E12/L (ref 3.5–5.5)
REPORT: NORMAL
SODIUM SERPL-SCNC: 133 MMOL/L (ref 132–146)
THIS TEST SENT TO: NORMAL
WBC # BLD: 29.5 E9/L (ref 4.5–11.5)

## 2023-05-24 PROCEDURE — 6360000002 HC RX W HCPCS: Performed by: NURSE PRACTITIONER

## 2023-05-24 PROCEDURE — 99232 SBSQ HOSP IP/OBS MODERATE 35: CPT | Performed by: INTERNAL MEDICINE

## 2023-05-24 PROCEDURE — 6370000000 HC RX 637 (ALT 250 FOR IP): Performed by: NURSE PRACTITIONER

## 2023-05-24 PROCEDURE — 85025 COMPLETE CBC W/AUTO DIFF WBC: CPT

## 2023-05-24 PROCEDURE — 2700000000 HC OXYGEN THERAPY PER DAY

## 2023-05-24 PROCEDURE — 6360000002 HC RX W HCPCS: Performed by: INTERNAL MEDICINE

## 2023-05-24 PROCEDURE — 6370000000 HC RX 637 (ALT 250 FOR IP)

## 2023-05-24 PROCEDURE — 80053 COMPREHEN METABOLIC PANEL: CPT

## 2023-05-24 PROCEDURE — 6370000000 HC RX 637 (ALT 250 FOR IP): Performed by: INTERNAL MEDICINE

## 2023-05-24 PROCEDURE — 2580000003 HC RX 258: Performed by: NURSE PRACTITIONER

## 2023-05-24 PROCEDURE — 94640 AIRWAY INHALATION TREATMENT: CPT

## 2023-05-24 PROCEDURE — 36415 COLL VENOUS BLD VENIPUNCTURE: CPT

## 2023-05-24 RX ORDER — ZINC SULFATE 50(220)MG
50 CAPSULE ORAL DAILY
Qty: 30 CAPSULE | Refills: 3
Start: 2023-05-25

## 2023-05-24 RX ORDER — LEVALBUTEROL INHALATION SOLUTION 0.63 MG/3ML
0.63 SOLUTION RESPIRATORY (INHALATION) EVERY 6 HOURS PRN
Qty: 120 EACH | Refills: 0
Start: 2023-05-24

## 2023-05-24 RX ORDER — FERROUS SULFATE 325(65) MG
325 TABLET ORAL
Qty: 30 TABLET | Refills: 3
Start: 2023-05-25

## 2023-05-24 RX ORDER — GUAIFENESIN/DEXTROMETHORPHAN 100-10MG/5
5 SYRUP ORAL EVERY 4 HOURS PRN
Qty: 120 ML | Refills: 0
Start: 2023-05-24 | End: 2023-06-03

## 2023-05-24 RX ORDER — CYANOCOBALAMIN 1000 UG/ML
1000 INJECTION, SOLUTION INTRAMUSCULAR; SUBCUTANEOUS DAILY
Qty: 2 ML | Refills: 0
Start: 2023-05-25 | End: 2023-05-27

## 2023-05-24 RX ORDER — BENZONATATE 100 MG/1
100 CAPSULE ORAL 3 TIMES DAILY PRN
Qty: 30 CAPSULE | Refills: 0
Start: 2023-05-24 | End: 2023-05-31

## 2023-05-24 RX ADMIN — CYANOCOBALAMIN 1000 MCG: 1000 INJECTION, SOLUTION INTRAMUSCULAR; SUBCUTANEOUS at 05:28

## 2023-05-24 RX ADMIN — FUROSEMIDE 20 MG: 20 TABLET ORAL at 07:54

## 2023-05-24 RX ADMIN — ACETAMINOPHEN 650 MG: 325 TABLET ORAL at 09:07

## 2023-05-24 RX ADMIN — Medication 50 MG: at 07:54

## 2023-05-24 RX ADMIN — DOXYCYCLINE HYCLATE 100 MG: 100 CAPSULE ORAL at 07:53

## 2023-05-24 RX ADMIN — LEVALBUTEROL HYDROCHLORIDE 0.63 MG: 0.63 SOLUTION RESPIRATORY (INHALATION) at 08:35

## 2023-05-24 RX ADMIN — FERROUS SULFATE TAB 325 MG (65 MG ELEMENTAL FE) 325 MG: 325 (65 FE) TAB at 07:54

## 2023-05-24 RX ADMIN — METOPROLOL SUCCINATE 100 MG: 25 TABLET, EXTENDED RELEASE ORAL at 07:53

## 2023-05-24 RX ADMIN — APIXABAN 2.5 MG: 2.5 TABLET, FILM COATED ORAL at 07:54

## 2023-05-24 RX ADMIN — CEFTRIAXONE SODIUM 1000 MG: 1 INJECTION, POWDER, FOR SOLUTION INTRAMUSCULAR; INTRAVENOUS at 13:17

## 2023-05-24 RX ADMIN — GUAIFENESIN SYRUP AND DEXTROMETHORPHAN 5 ML: 100; 10 SYRUP ORAL at 07:54

## 2023-05-24 RX ADMIN — DILTIAZEM HYDROCHLORIDE 120 MG: 120 CAPSULE, COATED, EXTENDED RELEASE ORAL at 07:54

## 2023-05-24 RX ADMIN — Medication 2000 UNITS: at 07:54

## 2023-05-24 RX ADMIN — BENZONATATE 100 MG: 100 CAPSULE ORAL at 07:54

## 2023-05-24 RX ADMIN — LEVALBUTEROL HYDROCHLORIDE 0.63 MG: 0.63 SOLUTION RESPIRATORY (INHALATION) at 13:02

## 2023-05-24 ASSESSMENT — PAIN SCALES - GENERAL
PAINLEVEL_OUTOF10: 0

## 2023-05-24 NOTE — DISCHARGE INSTR - COC
Continuity of Care Form    Patient Name: Melisa Fisher   :  1927  MRN:  [id number]    Admit date:  2023  Discharge date:  2023    Code Status Order: Limited   Advance Directives:     Admitting Physician:  Tessie Esparza MD  PCP: Tessie Esparza MD    Discharging Nurse: Rosa Ray RN  [address] Unit/Room#: 4983/1703-M  Discharging Unit Phone Number: [phone]    Emergency Contact:   Extended Emergency Contact Information  Primary Emergency Contact: R[address]GE [address] Phone: [phone]  Mobile Phone: [phone]  Relation: Child  Secondary Emergency Contact: Radha Palomares   [address] Phone: [phone]  Relation: Other    Past Surgical History:  Past Surgical History:   Procedure Laterality Date    APPENDECTOMY      EYE SURGERY Bilateral     cataract    TONSILLECTOMY      WISDOM TOOTH EXTRACTION         Immunization History: There is no immunization history on file for this patient.     Active Problems:  Patient Active Problem List   Diagnosis Code    Bruit of left carotid artery R09.89    Stenosis of both external carotid arteries I65.23    New onset atrial fibrillation (HCC) I48.91    Atrial fibrillation with RVR (HCC) I48.91    Acute on chronic heart failure with preserved ejection fraction (HFpEF) ([facility]) I50.33    DM (diabetes mellitus), type 2 (HCC) E11.9    PVD (peripheral vascular disease) with claudication ([facility]) I73.9    Pneumonia due to COVID-19 virus U07.1, J12.82    Pneumonia due to organism J18.9    Atrial fibrillation with rapid ventricular response ([facility] I48.91       Isolation/Infection:   Isolation            No Isolation          Patient Infection Status       Infection Onset Added Last Indicated Last Indicated By Review Planned Expiration Resolved Resolved By    None active    Resolved    COVID-19 (Rule Out) 23 Respiratory Panel, Molecular, with COVID-19 (Restricted: peds pts or suitable admitted adults)

## 2023-05-24 NOTE — PROGRESS NOTES
Brandy Diaz M.D.,[facility]  Daniel Nance D.O., FCHAITANYAONAHUN., Scout Houston M.D. Kam Ortega M.D. Navi Mckeon D.O. Daily Pulmonary Progress Note    Patient:  Wilber Geronimo 80 y.o. female MRN: [id number]     Date of Service: 5/22/2023      Synopsis     We are following patient for abnormal cxr and leukocytosis    \"CC\" cough    Code status:   Intubation/Re-intubation at the time of arrest No   Defibrillation/Cardioversion No   Chest Compressions No   Resuscitative Medications Yes         Subjective      Patient was seen and examined sitting in a chair in no apparent distress. Son is at bedside. She is on room air. Family has decided against bone marrow biopsy. She will have an ultrasound to determine size of effusion. She is now on nectar thick liquids per speech. Review of Systems:  Constitutional: Denies fever, weight loss, night sweats, and fatigue  Skin: Denies pigmentation, dark lesions, and rashes   HEENT: Denies hearing loss, tinnitus, ear drainage, epistaxis, sore throat, and hoarseness. Cardiovascular: Denies palpitations, chest pain, and chest pressure.   Respiratory: Cough and shortness of breath  Gastrointestinal: Denies nausea, vomiting, poor appetite, diarrhea, heartburn or reflux  Genitourinary: Denies dysuria, frequency, urgency or hematuria  Musculoskeletal: Denies myalgias, muscle weakness, and bone pain  Neurological: Denies dizziness, vertigo, headache, and focal weakness  Psychological: Denies anxiety and depression  Endocrine: Denies heat intolerance and cold intolerance  Hematopoietic/Lymphatic: Denies bleeding problems and blood transfusions    24-hour events:    None     Objective   Vitals: BP (!) 142/42   Pulse 53   Temp 97.6 °F (36.4 °C) (Oral)   Resp 18   Ht 5' 5\" (1.651 m)   Wt 119 lb 14.9 oz (54.4 kg)   SpO2 91%   BMI 19.96 kg/m²     I/O:    Intake/Output Summary (Last 24 hours) at 5/22/2023 1504  Last data filed at 5/22/2023 1200  Gross per 24
Call rec'd from central monitoring regarding pt rhythm. Pause of 2.933 and HR in low 30's. Call placed to attending and no new orders at this time. Will cont to monitor.
Db Rao M.D.,[facility]  Turner Crespo D.O., F.A.C.O.I., Jass Murrieta M.D. Ant Garcia M.D. Gracy Vo D.O. Daily Pulmonary Progress Note    Patient:  Emma Mccullough 80 y.o. female MRN: [id number]     Date of Service: 5/23/2023      Synopsis     We are following patient for abnormal cxr and leukocytosis    \"CC\" cough    Code status:   Intubation/Re-intubation at the time of arrest No   Defibrillation/Cardioversion No   Chest Compressions No   Resuscitative Medications Yes         Subjective      Patient was seen and examined sitting in a chair in no apparent distress. She remains on room air. Wheezing to auscultation. Continue modified diet    Review of Systems:  Constitutional: Denies fever, weight loss, night sweats, and fatigue  Skin: Denies pigmentation, dark lesions, and rashes   HEENT: Denies hearing loss, tinnitus, ear drainage, epistaxis, sore throat, and hoarseness. Cardiovascular: Denies palpitations, chest pain, and chest pressure.   Respiratory: Cough and shortness of breath  Gastrointestinal: Denies nausea, vomiting, poor appetite, diarrhea, heartburn or reflux  Genitourinary: Denies dysuria, frequency, urgency or hematuria  Musculoskeletal: Denies myalgias, muscle weakness, and bone pain  Neurological: Denies dizziness, vertigo, headache, and focal weakness  Psychological: Denies anxiety and depression  Endocrine: Denies heat intolerance and cold intolerance  Hematopoietic/Lymphatic: Denies bleeding problems and blood transfusions    24-hour events:    None     Objective   Vitals: /60   Pulse 71   Temp 97.6 °F (36.4 °C) (Temporal)   Resp 18   Ht 5' 5\" (1.651 m)   Wt 110 lb 2 oz (50 kg)   SpO2 95%   BMI 18.33 kg/m²     I/O:    Intake/Output Summary (Last 24 hours) at 5/23/2023 1215  Last data filed at 5/23/2023 1037  Gross per 24 hour   Intake 300 ml   Output 950 ml   Net -650 ml         CURRENT MEDS :  Scheduled Meds:   levalbuterol  0.63 mg
Feels better overall   Afebrile with elevated BP at times  RRR  Few bilateral rhonchi  Long discussion with family  No further testing planned  NIR placement to follow
Inpatient Medical Oncology Progress Note    Subjective:  Fatigue. Fair appetite. No fever chills. No nausea. No chest pain. Shortness of breath on exertion. Objective:  BP (!) 158/51   Pulse 83   Temp 98 °F (36.7 °C) (Oral)   Resp 19   Ht 5' 5\" (1.651 m)   Wt 118 lb 14.4 oz (53.9 kg)   SpO2 90%   BMI 19.79 kg/m²   GENERAL: Alert, oriented x 3, not in acute distress. HEENT: PERRLA; EOMI. Oropharynx clear. NECK: Supple. Without lymphadenopathy. LUNGS: Good air entry bilaterally. No wheezing, crackles or rhonchi. CARDIOVASCULAR: Regular rate. No murmurs, rubs or gallops. ABDOMEN: Soft. Non-tender, non-distended. EXTREMITIES: Without clubbing or cyanosis or edema. NEUROLOGIC: No focal deficits. Diagnostics:  Lab Results   Component Value Date    WBC 29.8 (H) 05/22/2023    HGB 8.5 (L) 05/22/2023    HCT 29.6 (L) 05/22/2023    MCV 80.0 05/22/2023     05/22/2023     Lab Results   Component Value Date     05/22/2023    K 3.9 05/22/2023    CL 96 (L) 05/22/2023    CO2 32 (H) 05/22/2023    BUN 13 05/22/2023    CREATININE 0.5 05/22/2023    GLUCOSE 181 (H) 05/22/2023    CALCIUM 8.7 05/22/2023    PROT 5.7 (L) 05/22/2023    LABALBU 2.3 (L) 05/22/2023    BILITOT 0.3 05/22/2023    ALKPHOS 70 05/22/2023    AST 84 (H) 05/22/2023    ALT 83 (H) 05/22/2023    LABGLOM >60 05/22/2023    GFRAA >60 01/14/2022     Lab Results   Component Value Date    FOLATE 16.6 05/20/2023     Lab Results   Component Value Date    ZESAGMPH90 157 (L) 05/20/2023     Impression/Plan:  20-year-old female with history of CHF, diabetes type 2, A-fib, hypertension, hyperlipidemia who presented to the ED on 5/17/2023 with shortness of breath and coughing. Chest x-ray revealed pulmonary parenchymal opacities in the lung bases, nonspecific interstitial prominence.   CT chest revealed small to moderate right and small left pleural effusions with adjacent atelectasis and opacifications greatest on the right, adenopathy in the
Inpatient Medical Oncology Progress Note    Subjective:  Fatigue. Fair appetite. No fever, chills. No nausea. No chest pain. SOB on exertion    Objective:  /65   Pulse 60   Temp 96.8 °F (36 °C) (Temporal)   Resp 18   Ht 5' 5\" (1.651 m)   Wt 112 lb 4 oz (50.9 kg)   SpO2 93%   BMI 18.68 kg/m²   GENERAL: Alert, oriented x 3, tired  HEENT: PERRLA; EOMI. Oropharynx clear. NECK: Supple. Without lymphadenopathy. LUNGS: Good air entry bilaterally. No wheezing, crackles or rhonchi. CARDIOVASCULAR: Regular rate. No murmurs, rubs or gallops. ABDOMEN: Soft. Non-tender, non-distended. EXTREMITIES: Without clubbing or cyanosis or edema. NEUROLOGIC: No focal deficits. Diagnostics:  Lab Results   Component Value Date    WBC 29.5 (H) 05/24/2023    HGB 9.2 (L) 05/24/2023    HCT 31.8 (L) 05/24/2023    MCV 78.7 (L) 05/24/2023     05/24/2023     Lab Results   Component Value Date     05/24/2023    K 3.9 05/24/2023    CL 93 (L) 05/24/2023    CO2 32 (H) 05/24/2023    BUN 14 05/24/2023    CREATININE 0.5 05/24/2023    GLUCOSE 236 (H) 05/24/2023    CALCIUM 8.7 05/24/2023    PROT 6.1 (L) 05/24/2023    LABALBU 2.4 (L) 05/24/2023    BILITOT 0.3 05/24/2023    ALKPHOS 66 05/24/2023    AST 25 05/24/2023    ALT 43 (H) 05/24/2023    LABGLOM >60 05/24/2023    GFRAA >60 01/14/2022     Lab Results   Component Value Date    [id number] 157 (L) 05/20/2023     Impression/Plan:  70-year-old female with history of CHF, diabetes type 2, A-fib, hypertension, hyperlipidemia who presented to the ED on 5/17/2023 with shortness of breath and coughing. Chest x-ray revealed pulmonary parenchymal opacities in the lung bases, nonspecific interstitial prominence. CT chest revealed small to moderate right and small left pleural effusions with adjacent atelectasis and opacifications greatest on the right, adenopathy in the mediastinum with a 2 cm right paratracheal lymph node, T12 compression deformity.   Upon presentation white
Occupational Therapy  OT BEDSIDE TREATMENT NOTE   [address] [address] [address]        Date:2023  Patient Name: Lani Potts  MRN: [id number]  : 1927  Room: [address] [place] [address]-     Per OT Eval:    Evaluating OT: Brooks Turcios OTR/L; [id number]        Referring Provider: Lida Neumann MD    Specific Provider Orders/Date: OT Eval and Treat 23        Diagnosis: Pneumonia due to organism; Atrial fibrillation with rapid ventricular response. 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 ([address] Bronchitis, Carotid stenosis, Diabetes ([address] Diabetic ulcer of left foot associated with type 2 diabetes mellitus, with necrosis of muscle ([address] DM (diabetes mellitus), type 2 ([address] Hyperlipidemia, Hypertension, Ischemic ulcer of foot, limited to breakdown of skin, left ([address] PVD (peripheral vascular disease) with claudication ([address] Stenosis of both external carotid arteries, Type 2 diabetes mellitus with left diabetic foot ulcer ([address] Ulcerated, foot, left, limited to breakdown of skin ([address] Ulcerated, foot, left, limited to breakdown of skin ([address] Ulcerated, foot, left, with fat layer exposed ([address] and Vitamin D deficiency.       Recommended Adaptive Equipment: AE LB dressing/bathing PRN      Precautions:  Fall Risk, O2, incontinence, +alarms      Assessment of current deficits    [x] Functional mobility            [x]ADLs           [x] Strength                  []Cognition    [x] Functional transfers          [x] IADLs         [x] Safety Awareness   [x]Endurance    [x] Fine Coordination                         [x] Balance      [] Vision/perception   []Sensation      []Gross Motor Coordination             [] ROM           []
Patient denies any problems with me today  Afebrile , vs stable  RRR with ectopics  Bilateral rhonchi  No peripheral edema   Continue present Rx including parenteral ATB  [facility]able discharge to [facility]
Report given to Dunia at [facility]. AVS faxed to [phone].
Resting comfortably   Says she feels \"better\"  RRR  Lungs somewhat more clear  No edema  Discharge to NIR
shortness of breath that had started about 1 week prior to presentation. Chest x-ray had revealed pulm parenchymal opacities in the lung base, nonspecific interstitial prominence, chest CT had revealed small to moderate right and small left pleural effusions with adjacent atelectasis and opacifications greatest on the right, adenopathy in the mediastinum with a 2 cm right paratracheal lymph node, T12 compression deformity. Upon presentation white count was 34, with ANC of 9410, ALC 00392, hemoglobin 8.1, hematocrit 37.5, MCV 76.8, platelet count 024F. 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 SPEP is negative for monoclonal proteins.  -Microcytic anemia secondary to iron deficiency in the setting of chronic anticoagulation with Eliquis, started iron supplement. She also has vitamin B12 and zinc deficiency, was started on vitamin B12 and zinc supplement.  -Continue to monitor her counts, her white count had decreased from 40.1-29.8, hemoglobin stable 8.5G/DL, platelet count is 057L. -The patient was started on antibiotics, doxycycline and ceftriaxone.  -Follow-up chest CT in 8 weeks per Pulmonary. Thank you for allowing us to participate in the care of  Mrs. Indira Tolentino.     Nga Olson MD   HEMATOLOGY/MEDICAL ONCOLOGY  [facility] 7WE IMCU  [facility] [address]  [address] [place] [address]  Dept: [address] [place] [address] Road: [phone]
Improve overall RUE strength WFL for participation in functional tasks         LUE [facility] 3+/5 grossly tested Good  and wfl FMC/dexterity noted during ADL tasks    Improve overall LUE strength WFL for participation in functional tasks         Hearing: [facility]   Sensation: No c/o numbness or tingling  Tone: WFL  Edema: Unremarkable        Comments/Treatment/Education: Upon arrival pt supine in bed, agreeable to therapy, speaking with nursing okaying pt to be seen this session. Pt completed of bed mobility, functional mobility of short steps, transfers and light ADL tasks this session. Pt was educated on role of OT, goals to be reached, importance of OOB activity, safety and hand placement with transfers, safety/balance and walker management during SPT, and compensatory strategies to assist with ADL tasks. At end of session, pt seated upright in chair, all lines and tubes intact, call light within reach. Pt has made fair progress towards set goals.    Continue with current plan of care focusing on increasing of independency with transfers and ADL tasks      Treatment Time In: 9:35am           Treatment Time Out: 9:58am                Treatment Charges: Mins Units   Ther Ex  85389     Manual Therapy 13599     Thera Activities 50864 8 1   ADL/Home Mgt 72684 15 1   Neuro Re-ed 57844     Group Therapy      Orthotic manage/training  64973     Non-Billable Time     Total Timed Treatment 23 2        Therese RIVERA/L 10666
minutes  [] Therapeutic exercises 65359 0 minutes  [] Neuromuscular reeducation 74368 0 minutes    Jd Aldana PT, DPT  [id number]

## 2023-05-27 NOTE — DISCHARGE SUMMARY
[address]                  [address] [facility],  [facility]                               DISCHARGE SUMMARY    PATIENT NAME: David Roe                     :        1927  MED REC NO:   [id number]                            ROOM:       [id number]  ACCOUNT NO:   [de-identified]                           ADMIT DATE: 2023  PROVIDER:     Remedios Jama MD                  DISCHARGE DATE:  2023    HISTORY OF PRESENT ILLNESS AND HOSPITAL COURSE:  The patient is a  80-year-old female with history of paroxysmal atrial fibrillation on  Eliquis, heart failure with preserved ejection fraction, type 2 diabetes  mellitus, peripheral vascular disease, COVID pneumonia approximately six  months ago who describes onset of symptoms over the last several days or  longer. According to her son, she has been becoming increasingly  weakened and has had a nonproductive cough that has progressed to the  point where she is no longer able to get up or walk unassisted. In the  emergency room, she had leukocytosis with a white count of 33,000, but  she denies fevers, shortness of breath or any other symptoms other than  nonproductive cough and weakness. She denied chest pain. Biggest  problem was that she was unable to function independently in her home  setting, unable to stand or walk, to be assisted by her family. The  patient was cultured up. She was admitted to Telemetry. She was seen  in consultation by Pulmonary Medicine. Chest x-ray demonstrated what  appeared to be right lower lobe infiltrates. She was also seen by EP  for atrial fibrillation with RVR on admission. That was treated with  Cardizem drip. She converted nicely. She was put back on her oral  Cardizem and the drip was stopped. She was given magnesium sulfate in  the ER.   She was begun on intravenous antibiotics, Rocephin 1 gm IV  daily and doxycycline every 12 hours according to Pulmonary after